# Patient Record
Sex: FEMALE | Race: WHITE | NOT HISPANIC OR LATINO
[De-identification: names, ages, dates, MRNs, and addresses within clinical notes are randomized per-mention and may not be internally consistent; named-entity substitution may affect disease eponyms.]

---

## 2017-01-20 ENCOUNTER — APPOINTMENT (OUTPATIENT)
Dept: CARDIOLOGY | Facility: CLINIC | Age: 82
End: 2017-01-20

## 2017-01-20 ENCOUNTER — NON-APPOINTMENT (OUTPATIENT)
Age: 82
End: 2017-01-20

## 2017-01-20 VITALS
WEIGHT: 216 LBS | HEART RATE: 78 BPM | OXYGEN SATURATION: 97 % | HEIGHT: 62 IN | BODY MASS INDEX: 39.75 KG/M2 | SYSTOLIC BLOOD PRESSURE: 160 MMHG | DIASTOLIC BLOOD PRESSURE: 86 MMHG

## 2017-01-23 ENCOUNTER — APPOINTMENT (OUTPATIENT)
Dept: SURGICAL ONCOLOGY | Facility: CLINIC | Age: 82
End: 2017-01-23

## 2017-01-24 ENCOUNTER — APPOINTMENT (OUTPATIENT)
Dept: CARDIOLOGY | Facility: CLINIC | Age: 82
End: 2017-01-24

## 2017-01-26 ENCOUNTER — MEDICATION RENEWAL (OUTPATIENT)
Age: 82
End: 2017-01-26

## 2017-01-27 ENCOUNTER — MEDICATION RENEWAL (OUTPATIENT)
Age: 82
End: 2017-01-27

## 2017-01-31 ENCOUNTER — OUTPATIENT (OUTPATIENT)
Dept: OUTPATIENT SERVICES | Facility: HOSPITAL | Age: 82
LOS: 1 days | End: 2017-01-31

## 2017-01-31 DIAGNOSIS — Z98.89 OTHER SPECIFIED POSTPROCEDURAL STATES: Chronic | ICD-10-CM

## 2017-01-31 DIAGNOSIS — Z90.710 ACQUIRED ABSENCE OF BOTH CERVIX AND UTERUS: Chronic | ICD-10-CM

## 2017-01-31 DIAGNOSIS — Z09 ENCOUNTER FOR FOLLOW-UP EXAMINATION AFTER COMPLETED TREATMENT FOR CONDITIONS OTHER THAN MALIGNANT NEOPLASM: Chronic | ICD-10-CM

## 2017-02-07 ENCOUNTER — APPOINTMENT (OUTPATIENT)
Dept: INTERNAL MEDICINE | Facility: CLINIC | Age: 82
End: 2017-02-07

## 2017-02-07 ENCOUNTER — LABORATORY RESULT (OUTPATIENT)
Age: 82
End: 2017-02-07

## 2017-02-07 VITALS
DIASTOLIC BLOOD PRESSURE: 79 MMHG | WEIGHT: 218 LBS | HEART RATE: 76 BPM | RESPIRATION RATE: 16 BRPM | SYSTOLIC BLOOD PRESSURE: 156 MMHG | BODY MASS INDEX: 39.87 KG/M2

## 2017-02-07 DIAGNOSIS — N30.00 ACUTE CYSTITIS W/OUT HEMATURIA: ICD-10-CM

## 2017-02-13 LAB
APPEARANCE: ABNORMAL
BILIRUBIN URINE: NEGATIVE
BLOOD URINE: NEGATIVE
COLOR: YELLOW
GLUCOSE QUALITATIVE U: NORMAL MG/DL
KETONES URINE: NEGATIVE
LEUKOCYTE ESTERASE URINE: NEGATIVE
NITRITE URINE: NEGATIVE
PH URINE: 7
PROTEIN URINE: NEGATIVE MG/DL
SPECIFIC GRAVITY URINE: 1.02
UROBILINOGEN URINE: 1 MG/DL

## 2017-02-21 ENCOUNTER — APPOINTMENT (OUTPATIENT)
Dept: INTERNAL MEDICINE | Facility: CLINIC | Age: 82
End: 2017-02-21

## 2017-02-21 VITALS
HEART RATE: 82 BPM | OXYGEN SATURATION: 94 % | RESPIRATION RATE: 14 BRPM | SYSTOLIC BLOOD PRESSURE: 112 MMHG | TEMPERATURE: 98.3 F | WEIGHT: 205 LBS | BODY MASS INDEX: 37.5 KG/M2 | DIASTOLIC BLOOD PRESSURE: 76 MMHG

## 2017-02-28 ENCOUNTER — INPATIENT (INPATIENT)
Facility: HOSPITAL | Age: 82
LOS: 2 days | Discharge: NURSING, REHAB OR LONG-TERM CARE | End: 2017-03-03
Payer: MEDICARE

## 2017-02-28 ENCOUNTER — OUTPATIENT (OUTPATIENT)
Dept: OUTPATIENT SERVICES | Facility: HOSPITAL | Age: 82
LOS: 1 days | End: 2017-02-28

## 2017-02-28 DIAGNOSIS — Z98.89 OTHER SPECIFIED POSTPROCEDURAL STATES: Chronic | ICD-10-CM

## 2017-02-28 DIAGNOSIS — Z90.710 ACQUIRED ABSENCE OF BOTH CERVIX AND UTERUS: Chronic | ICD-10-CM

## 2017-02-28 DIAGNOSIS — Z09 ENCOUNTER FOR FOLLOW-UP EXAMINATION AFTER COMPLETED TREATMENT FOR CONDITIONS OTHER THAN MALIGNANT NEOPLASM: Chronic | ICD-10-CM

## 2017-02-28 PROCEDURE — 73560 X-RAY EXAM OF KNEE 1 OR 2: CPT | Mod: 26,LT

## 2017-03-01 ENCOUNTER — OUTPATIENT (OUTPATIENT)
Dept: OUTPATIENT SERVICES | Facility: HOSPITAL | Age: 82
LOS: 1 days | End: 2017-03-01

## 2017-03-01 DIAGNOSIS — Z98.89 OTHER SPECIFIED POSTPROCEDURAL STATES: Chronic | ICD-10-CM

## 2017-03-01 DIAGNOSIS — Z90.710 ACQUIRED ABSENCE OF BOTH CERVIX AND UTERUS: Chronic | ICD-10-CM

## 2017-03-01 DIAGNOSIS — Z09 ENCOUNTER FOR FOLLOW-UP EXAMINATION AFTER COMPLETED TREATMENT FOR CONDITIONS OTHER THAN MALIGNANT NEOPLASM: Chronic | ICD-10-CM

## 2017-03-02 ENCOUNTER — OUTPATIENT (OUTPATIENT)
Dept: OUTPATIENT SERVICES | Facility: HOSPITAL | Age: 82
LOS: 1 days | End: 2017-03-02

## 2017-03-02 DIAGNOSIS — Z98.89 OTHER SPECIFIED POSTPROCEDURAL STATES: Chronic | ICD-10-CM

## 2017-03-02 DIAGNOSIS — Z09 ENCOUNTER FOR FOLLOW-UP EXAMINATION AFTER COMPLETED TREATMENT FOR CONDITIONS OTHER THAN MALIGNANT NEOPLASM: Chronic | ICD-10-CM

## 2017-03-02 DIAGNOSIS — Z90.710 ACQUIRED ABSENCE OF BOTH CERVIX AND UTERUS: Chronic | ICD-10-CM

## 2017-03-03 ENCOUNTER — OUTPATIENT (OUTPATIENT)
Dept: OUTPATIENT SERVICES | Facility: HOSPITAL | Age: 82
LOS: 1 days | End: 2017-03-03

## 2017-03-03 DIAGNOSIS — Z98.89 OTHER SPECIFIED POSTPROCEDURAL STATES: Chronic | ICD-10-CM

## 2017-03-03 DIAGNOSIS — Z09 ENCOUNTER FOR FOLLOW-UP EXAMINATION AFTER COMPLETED TREATMENT FOR CONDITIONS OTHER THAN MALIGNANT NEOPLASM: Chronic | ICD-10-CM

## 2017-03-03 DIAGNOSIS — Z90.710 ACQUIRED ABSENCE OF BOTH CERVIX AND UTERUS: Chronic | ICD-10-CM

## 2017-04-18 ENCOUNTER — RX RENEWAL (OUTPATIENT)
Age: 82
End: 2017-04-18

## 2017-04-26 ENCOUNTER — OTHER (OUTPATIENT)
Age: 82
End: 2017-04-26

## 2017-04-26 DIAGNOSIS — D64.9 ANEMIA, UNSPECIFIED: ICD-10-CM

## 2017-04-28 ENCOUNTER — RX RENEWAL (OUTPATIENT)
Age: 82
End: 2017-04-28

## 2017-05-02 ENCOUNTER — APPOINTMENT (OUTPATIENT)
Dept: INTERNAL MEDICINE | Facility: CLINIC | Age: 82
End: 2017-05-02

## 2017-05-03 LAB
25(OH)D3 SERPL-MCNC: 45.5 NG/ML
ALBUMIN SERPL ELPH-MCNC: 4.1 G/DL
ALP BLD-CCNC: 97 U/L
ALT SERPL-CCNC: 20 U/L
ANION GAP SERPL CALC-SCNC: 16 MMOL/L
APPEARANCE: CLEAR
AST SERPL-CCNC: 29 U/L
BASOPHILS # BLD AUTO: 0.03 K/UL
BASOPHILS NFR BLD AUTO: 0.5 %
BILIRUB SERPL-MCNC: 0.7 MG/DL
BILIRUBIN URINE: NEGATIVE
BLOOD URINE: NEGATIVE
BUN SERPL-MCNC: 23 MG/DL
CALCIUM SERPL-MCNC: 10 MG/DL
CHLORIDE SERPL-SCNC: 102 MMOL/L
CHOLEST SERPL-MCNC: 173 MG/DL
CHOLEST/HDLC SERPL: 2.4 RATIO
CO2 SERPL-SCNC: 22 MMOL/L
COLOR: YELLOW
CREAT SERPL-MCNC: 0.57 MG/DL
EOSINOPHIL # BLD AUTO: 0.29 K/UL
EOSINOPHIL NFR BLD AUTO: 5.1 %
GLUCOSE QUALITATIVE U: NORMAL MG/DL
GLUCOSE SERPL-MCNC: 127 MG/DL
HBA1C MFR BLD HPLC: 5.9 %
HCT VFR BLD CALC: 40.1 %
HDLC SERPL-MCNC: 73 MG/DL
HGB BLD-MCNC: 13 G/DL
IMM GRANULOCYTES NFR BLD AUTO: 0.4 %
KETONES URINE: NEGATIVE
LDLC SERPL CALC-MCNC: 79 MG/DL
LEUKOCYTE ESTERASE URINE: NEGATIVE
LYMPHOCYTES # BLD AUTO: 2.03 K/UL
LYMPHOCYTES NFR BLD AUTO: 35.7 %
MAN DIFF?: NORMAL
MCHC RBC-ENTMCNC: 30.1 PG
MCHC RBC-ENTMCNC: 32.4 GM/DL
MCV RBC AUTO: 92.8 FL
MONOCYTES # BLD AUTO: 0.74 K/UL
MONOCYTES NFR BLD AUTO: 13 %
NEUTROPHILS # BLD AUTO: 2.58 K/UL
NEUTROPHILS NFR BLD AUTO: 45.3 %
NITRITE URINE: NEGATIVE
PH URINE: 6.5
PLATELET # BLD AUTO: 228 K/UL
POTASSIUM SERPL-SCNC: 4.4 MMOL/L
PROT SERPL-MCNC: 7.5 G/DL
PROTEIN URINE: NEGATIVE MG/DL
RBC # BLD: 4.32 M/UL
RBC # FLD: 15.2 %
SODIUM SERPL-SCNC: 140 MMOL/L
SPECIFIC GRAVITY URINE: 1.01
TRIGL SERPL-MCNC: 103 MG/DL
UROBILINOGEN URINE: NORMAL MG/DL
WBC # FLD AUTO: 5.69 K/UL

## 2017-05-08 ENCOUNTER — RX RENEWAL (OUTPATIENT)
Age: 82
End: 2017-05-08

## 2017-06-13 ENCOUNTER — OTHER (OUTPATIENT)
Age: 82
End: 2017-06-13

## 2017-06-13 ENCOUNTER — NON-APPOINTMENT (OUTPATIENT)
Age: 82
End: 2017-06-13

## 2017-06-13 ENCOUNTER — APPOINTMENT (OUTPATIENT)
Dept: INTERNAL MEDICINE | Facility: CLINIC | Age: 82
End: 2017-06-13

## 2017-06-13 VITALS
OXYGEN SATURATION: 97 % | HEIGHT: 61 IN | DIASTOLIC BLOOD PRESSURE: 80 MMHG | BODY MASS INDEX: 37.57 KG/M2 | HEART RATE: 81 BPM | WEIGHT: 199 LBS | RESPIRATION RATE: 14 BRPM | SYSTOLIC BLOOD PRESSURE: 142 MMHG

## 2017-06-13 DIAGNOSIS — B37.9 CANDIDIASIS, UNSPECIFIED: ICD-10-CM

## 2017-06-13 RX ORDER — NYSTATIN AND TRIAMCINOLONE ACETONIDE 100000; 1 MG/G; MG/G
100000-0.1 CREAM TOPICAL TWICE DAILY
Qty: 1 | Refills: 5 | Status: ACTIVE | COMMUNITY
Start: 2017-06-13 | End: 1900-01-01

## 2017-06-20 ENCOUNTER — OTHER (OUTPATIENT)
Age: 82
End: 2017-06-20

## 2017-07-07 ENCOUNTER — MEDICATION RENEWAL (OUTPATIENT)
Age: 82
End: 2017-07-07

## 2017-07-10 LAB
ANION GAP SERPL CALC-SCNC: 14 MMOL/L
BACTERIA UR CULT: ABNORMAL
BASOPHILS # BLD AUTO: 0.01 K/UL
BASOPHILS NFR BLD AUTO: 0.2 %
BUN SERPL-MCNC: 27 MG/DL
CALCIUM SERPL-MCNC: 10.3 MG/DL
CHLORIDE SERPL-SCNC: 100 MMOL/L
CO2 SERPL-SCNC: 25 MMOL/L
CREAT SERPL-MCNC: 0.72 MG/DL
EOSINOPHIL # BLD AUTO: 0.13 K/UL
EOSINOPHIL NFR BLD AUTO: 2.5 %
GLUCOSE SERPL-MCNC: 104 MG/DL
HCT VFR BLD CALC: 40.8 %
HGB BLD-MCNC: 13.3 G/DL
IMM GRANULOCYTES NFR BLD AUTO: 0.4 %
LYMPHOCYTES # BLD AUTO: 1.36 K/UL
LYMPHOCYTES NFR BLD AUTO: 25.7 %
MAN DIFF?: NORMAL
MCHC RBC-ENTMCNC: 29.8 PG
MCHC RBC-ENTMCNC: 32.6 GM/DL
MCV RBC AUTO: 91.3 FL
MONOCYTES # BLD AUTO: 0.75 K/UL
MONOCYTES NFR BLD AUTO: 14.2 %
NEUTROPHILS # BLD AUTO: 3.03 K/UL
NEUTROPHILS NFR BLD AUTO: 57 %
PLATELET # BLD AUTO: 209 K/UL
POTASSIUM SERPL-SCNC: 4.2 MMOL/L
RBC # BLD: 4.47 M/UL
RBC # FLD: 14.1 %
SODIUM SERPL-SCNC: 139 MMOL/L
WBC # FLD AUTO: 5.3 K/UL

## 2017-09-26 ENCOUNTER — APPOINTMENT (OUTPATIENT)
Dept: INTERNAL MEDICINE | Facility: CLINIC | Age: 82
End: 2017-09-26
Payer: COMMERCIAL

## 2017-09-26 VITALS
BODY MASS INDEX: 37.41 KG/M2 | SYSTOLIC BLOOD PRESSURE: 126 MMHG | WEIGHT: 198 LBS | OXYGEN SATURATION: 97 % | DIASTOLIC BLOOD PRESSURE: 82 MMHG | HEART RATE: 80 BPM | RESPIRATION RATE: 14 BRPM

## 2017-09-26 DIAGNOSIS — R07.9 CHEST PAIN, UNSPECIFIED: ICD-10-CM

## 2017-09-26 PROCEDURE — 99214 OFFICE O/P EST MOD 30 MIN: CPT

## 2017-10-10 ENCOUNTER — RX RENEWAL (OUTPATIENT)
Age: 82
End: 2017-10-10

## 2017-10-24 ENCOUNTER — CLINICAL ADVICE (OUTPATIENT)
Age: 82
End: 2017-10-24

## 2017-10-30 ENCOUNTER — OTHER (OUTPATIENT)
Age: 82
End: 2017-10-30

## 2017-11-03 ENCOUNTER — RX RENEWAL (OUTPATIENT)
Age: 82
End: 2017-11-03

## 2017-11-10 ENCOUNTER — OTHER (OUTPATIENT)
Age: 82
End: 2017-11-10

## 2017-11-13 LAB
ALBUMIN SERPL ELPH-MCNC: 3.7 G/DL
ALP BLD-CCNC: 115 U/L
ALT SERPL-CCNC: 22 U/L
ANION GAP SERPL CALC-SCNC: 14 MMOL/L
APPEARANCE: CLEAR
AST SERPL-CCNC: 21 U/L
BASOPHILS # BLD AUTO: 0.02 K/UL
BASOPHILS NFR BLD AUTO: 0.4 %
BILIRUB SERPL-MCNC: 0.7 MG/DL
BILIRUBIN URINE: NEGATIVE
BLOOD URINE: NEGATIVE
BUN SERPL-MCNC: 19 MG/DL
CALCIUM SERPL-MCNC: 9.5 MG/DL
CHLORIDE SERPL-SCNC: 100 MMOL/L
CHOLEST SERPL-MCNC: 158 MG/DL
CHOLEST/HDLC SERPL: 2.3 RATIO
CO2 SERPL-SCNC: 26 MMOL/L
COLOR: YELLOW
CREAT SERPL-MCNC: 0.69 MG/DL
EOSINOPHIL # BLD AUTO: 0.14 K/UL
EOSINOPHIL NFR BLD AUTO: 2.8 %
GLUCOSE QUALITATIVE U: NEGATIVE MG/DL
GLUCOSE SERPL-MCNC: 141 MG/DL
HBA1C MFR BLD HPLC: 5.5 %
HCT VFR BLD CALC: 40.4 %
HDLC SERPL-MCNC: 70 MG/DL
HGB BLD-MCNC: 13.4 G/DL
IMM GRANULOCYTES NFR BLD AUTO: 0.2 %
KETONES URINE: NEGATIVE
LDLC SERPL CALC-MCNC: 69 MG/DL
LEUKOCYTE ESTERASE URINE: ABNORMAL
LYMPHOCYTES # BLD AUTO: 1.64 K/UL
LYMPHOCYTES NFR BLD AUTO: 33.3 %
MAN DIFF?: NORMAL
MCHC RBC-ENTMCNC: 29.8 PG
MCHC RBC-ENTMCNC: 33.2 GM/DL
MCV RBC AUTO: 90 FL
MONOCYTES # BLD AUTO: 0.63 K/UL
MONOCYTES NFR BLD AUTO: 12.8 %
NEUTROPHILS # BLD AUTO: 2.49 K/UL
NEUTROPHILS NFR BLD AUTO: 50.5 %
NITRITE URINE: POSITIVE
PH URINE: 6
PLATELET # BLD AUTO: 192 K/UL
POTASSIUM SERPL-SCNC: 3.9 MMOL/L
PROT SERPL-MCNC: 7.5 G/DL
PROTEIN URINE: NEGATIVE MG/DL
RBC # BLD: 4.49 M/UL
RBC # FLD: 13.4 %
SODIUM SERPL-SCNC: 140 MMOL/L
SPECIFIC GRAVITY URINE: 1.01
TRIGL SERPL-MCNC: 95 MG/DL
UROBILINOGEN URINE: NEGATIVE MG/DL
WBC # FLD AUTO: 4.93 K/UL

## 2017-11-14 ENCOUNTER — APPOINTMENT (OUTPATIENT)
Dept: INTERNAL MEDICINE | Facility: CLINIC | Age: 82
End: 2017-11-14
Payer: COMMERCIAL

## 2017-11-14 ENCOUNTER — NON-APPOINTMENT (OUTPATIENT)
Age: 82
End: 2017-11-14

## 2017-11-14 VITALS
SYSTOLIC BLOOD PRESSURE: 132 MMHG | HEIGHT: 61 IN | OXYGEN SATURATION: 98 % | HEART RATE: 82 BPM | BODY MASS INDEX: 37.76 KG/M2 | DIASTOLIC BLOOD PRESSURE: 80 MMHG | WEIGHT: 200 LBS

## 2017-11-14 PROCEDURE — 90686 IIV4 VACC NO PRSV 0.5 ML IM: CPT

## 2017-11-14 PROCEDURE — G0008: CPT

## 2017-11-14 PROCEDURE — 99214 OFFICE O/P EST MOD 30 MIN: CPT | Mod: 25

## 2017-11-14 PROCEDURE — 93000 ELECTROCARDIOGRAM COMPLETE: CPT

## 2017-11-28 ENCOUNTER — APPOINTMENT (OUTPATIENT)
Dept: CARDIOLOGY | Facility: CLINIC | Age: 82
End: 2017-11-28
Payer: COMMERCIAL

## 2017-11-28 PROCEDURE — 93880 EXTRACRANIAL BILAT STUDY: CPT

## 2017-11-30 ENCOUNTER — RX RENEWAL (OUTPATIENT)
Age: 82
End: 2017-11-30

## 2018-02-27 ENCOUNTER — APPOINTMENT (OUTPATIENT)
Dept: CARDIOLOGY | Facility: CLINIC | Age: 83
End: 2018-02-27
Payer: COMMERCIAL

## 2018-02-27 VITALS
WEIGHT: 201 LBS | OXYGEN SATURATION: 98 % | BODY MASS INDEX: 37.95 KG/M2 | DIASTOLIC BLOOD PRESSURE: 93 MMHG | SYSTOLIC BLOOD PRESSURE: 150 MMHG | HEIGHT: 61 IN | HEART RATE: 79 BPM

## 2018-02-27 PROCEDURE — 99214 OFFICE O/P EST MOD 30 MIN: CPT

## 2018-05-21 ENCOUNTER — APPOINTMENT (OUTPATIENT)
Dept: CARDIOLOGY | Facility: CLINIC | Age: 83
End: 2018-05-21
Payer: COMMERCIAL

## 2018-05-21 PROCEDURE — 93306 TTE W/DOPPLER COMPLETE: CPT

## 2018-05-24 ENCOUNTER — APPOINTMENT (OUTPATIENT)
Dept: CARDIOLOGY | Facility: CLINIC | Age: 83
End: 2018-05-24

## 2018-06-04 ENCOUNTER — TRANSCRIPTION ENCOUNTER (OUTPATIENT)
Age: 83
End: 2018-06-04

## 2018-06-05 ENCOUNTER — RESULT REVIEW (OUTPATIENT)
Age: 83
End: 2018-06-05

## 2018-06-05 ENCOUNTER — OUTPATIENT (OUTPATIENT)
Dept: OUTPATIENT SERVICES | Facility: HOSPITAL | Age: 83
LOS: 1 days | End: 2018-06-05
Payer: COMMERCIAL

## 2018-06-05 DIAGNOSIS — Z98.89 OTHER SPECIFIED POSTPROCEDURAL STATES: Chronic | ICD-10-CM

## 2018-06-05 DIAGNOSIS — Z90.710 ACQUIRED ABSENCE OF BOTH CERVIX AND UTERUS: Chronic | ICD-10-CM

## 2018-06-05 DIAGNOSIS — K21.0 GASTRO-ESOPHAGEAL REFLUX DISEASE WITH ESOPHAGITIS: ICD-10-CM

## 2018-06-05 DIAGNOSIS — Z09 ENCOUNTER FOR FOLLOW-UP EXAMINATION AFTER COMPLETED TREATMENT FOR CONDITIONS OTHER THAN MALIGNANT NEOPLASM: Chronic | ICD-10-CM

## 2018-06-05 PROCEDURE — 88108 CYTOPATH CONCENTRATE TECH: CPT

## 2018-06-05 PROCEDURE — 43239 EGD BIOPSY SINGLE/MULTIPLE: CPT

## 2018-06-05 PROCEDURE — 88305 TISSUE EXAM BY PATHOLOGIST: CPT

## 2018-06-05 PROCEDURE — 88313 SPECIAL STAINS GROUP 2: CPT | Mod: 26

## 2018-06-05 PROCEDURE — 88312 SPECIAL STAINS GROUP 1: CPT | Mod: 26

## 2018-06-05 PROCEDURE — 88305 TISSUE EXAM BY PATHOLOGIST: CPT | Mod: 26

## 2018-06-05 PROCEDURE — 88108 CYTOPATH CONCENTRATE TECH: CPT | Mod: 26

## 2018-06-05 PROCEDURE — 88313 SPECIAL STAINS GROUP 2: CPT

## 2018-06-05 PROCEDURE — 88312 SPECIAL STAINS GROUP 1: CPT

## 2018-06-06 LAB
NON-GYN CYTOLOGY SPEC: SIGNIFICANT CHANGE UP
SURGICAL PATHOLOGY FINAL REPORT - CH: SIGNIFICANT CHANGE UP

## 2018-07-05 ENCOUNTER — EMERGENCY (EMERGENCY)
Facility: HOSPITAL | Age: 83
LOS: 1 days | Discharge: ANOTHER HOSPITAL | End: 2018-07-05
Attending: EMERGENCY MEDICINE
Payer: COMMERCIAL

## 2018-07-05 ENCOUNTER — INPATIENT (INPATIENT)
Facility: HOSPITAL | Age: 83
LOS: 4 days | Discharge: HOME HEALTH | DRG: 640 | End: 2018-07-10
Attending: HOSPITALIST | Admitting: HOSPITALIST
Payer: MEDICARE

## 2018-07-05 VITALS
DIASTOLIC BLOOD PRESSURE: 59 MMHG | TEMPERATURE: 98 F | RESPIRATION RATE: 14 BRPM | WEIGHT: 190.7 LBS | HEIGHT: 62 IN | HEART RATE: 96 BPM | SYSTOLIC BLOOD PRESSURE: 125 MMHG | OXYGEN SATURATION: 97 %

## 2018-07-05 VITALS — RESPIRATION RATE: 26 BRPM | HEIGHT: 62 IN | WEIGHT: 205.03 LBS | HEART RATE: 30 BPM

## 2018-07-05 VITALS
DIASTOLIC BLOOD PRESSURE: 45 MMHG | HEART RATE: 100 BPM | RESPIRATION RATE: 15 BRPM | OXYGEN SATURATION: 98 % | SYSTOLIC BLOOD PRESSURE: 105 MMHG | TEMPERATURE: 96 F

## 2018-07-05 DIAGNOSIS — I49.8 OTHER SPECIFIED CARDIAC ARRHYTHMIAS: ICD-10-CM

## 2018-07-05 DIAGNOSIS — Z98.89 OTHER SPECIFIED POSTPROCEDURAL STATES: Chronic | ICD-10-CM

## 2018-07-05 DIAGNOSIS — I45.9 CONDUCTION DISORDER, UNSPECIFIED: ICD-10-CM

## 2018-07-05 DIAGNOSIS — E87.5 HYPERKALEMIA: ICD-10-CM

## 2018-07-05 DIAGNOSIS — Z09 ENCOUNTER FOR FOLLOW-UP EXAMINATION AFTER COMPLETED TREATMENT FOR CONDITIONS OTHER THAN MALIGNANT NEOPLASM: Chronic | ICD-10-CM

## 2018-07-05 DIAGNOSIS — E87.2 ACIDOSIS: ICD-10-CM

## 2018-07-05 DIAGNOSIS — Z90.710 ACQUIRED ABSENCE OF BOTH CERVIX AND UTERUS: Chronic | ICD-10-CM

## 2018-07-05 LAB
ALBUMIN SERPL ELPH-MCNC: 3.5 G/DL — SIGNIFICANT CHANGE UP (ref 3.3–5)
ALBUMIN SERPL ELPH-MCNC: 3.7 G/DL — SIGNIFICANT CHANGE UP (ref 3.3–5)
ALP SERPL-CCNC: 153 U/L — HIGH (ref 40–120)
ALP SERPL-CCNC: 176 U/L — HIGH (ref 40–120)
ALT FLD-CCNC: 43 U/L — SIGNIFICANT CHANGE UP (ref 10–45)
ALT FLD-CCNC: 53 U/L — SIGNIFICANT CHANGE UP (ref 12–78)
ANION GAP SERPL CALC-SCNC: 10 MMOL/L — SIGNIFICANT CHANGE UP (ref 5–17)
ANION GAP SERPL CALC-SCNC: 16 MMOL/L — SIGNIFICANT CHANGE UP (ref 5–17)
APTT BLD: 31.6 SEC — SIGNIFICANT CHANGE UP (ref 27.5–37.4)
AST SERPL-CCNC: 34 U/L — SIGNIFICANT CHANGE UP (ref 10–40)
AST SERPL-CCNC: 40 U/L — HIGH (ref 15–37)
BASOPHILS # BLD AUTO: 0.04 K/UL — SIGNIFICANT CHANGE UP (ref 0–0.2)
BASOPHILS NFR BLD AUTO: 0.5 % — SIGNIFICANT CHANGE UP (ref 0–2)
BILIRUB SERPL-MCNC: 0.4 MG/DL — SIGNIFICANT CHANGE UP (ref 0.2–1.2)
BILIRUB SERPL-MCNC: 0.4 MG/DL — SIGNIFICANT CHANGE UP (ref 0.2–1.2)
BUN SERPL-MCNC: 52 MG/DL — HIGH (ref 7–23)
BUN SERPL-MCNC: 54 MG/DL — HIGH (ref 7–23)
CALCIUM SERPL-MCNC: 10.7 MG/DL — HIGH (ref 8.4–10.5)
CALCIUM SERPL-MCNC: 9.7 MG/DL — SIGNIFICANT CHANGE UP (ref 8.5–10.1)
CHLORIDE SERPL-SCNC: 104 MMOL/L — SIGNIFICANT CHANGE UP (ref 96–108)
CHLORIDE SERPL-SCNC: 107 MMOL/L — SIGNIFICANT CHANGE UP (ref 96–108)
CK MB BLD-MCNC: 7.6 % — HIGH (ref 0–3.5)
CK MB CFR SERPL CALC: 3.2 NG/ML — SIGNIFICANT CHANGE UP (ref 0–3.6)
CK MB CFR SERPL CALC: 4.3 NG/ML — HIGH (ref 0–3.8)
CK SERPL-CCNC: 34 U/L — SIGNIFICANT CHANGE UP (ref 25–170)
CK SERPL-CCNC: 42 U/L — SIGNIFICANT CHANGE UP (ref 26–192)
CO2 SERPL-SCNC: 16 MMOL/L — LOW (ref 22–31)
CO2 SERPL-SCNC: 17 MMOL/L — LOW (ref 22–31)
CREAT SERPL-MCNC: 1.32 MG/DL — HIGH (ref 0.5–1.3)
CREAT SERPL-MCNC: 1.5 MG/DL — HIGH (ref 0.5–1.3)
EOSINOPHIL # BLD AUTO: 0.17 K/UL — SIGNIFICANT CHANGE UP (ref 0–0.5)
EOSINOPHIL NFR BLD AUTO: 2.1 % — SIGNIFICANT CHANGE UP (ref 0–6)
GLUCOSE SERPL-MCNC: 185 MG/DL — HIGH (ref 70–99)
GLUCOSE SERPL-MCNC: 61 MG/DL — LOW (ref 70–99)
HCT VFR BLD CALC: 36.4 % — SIGNIFICANT CHANGE UP (ref 34.5–45)
HCT VFR BLD CALC: 36.9 % — SIGNIFICANT CHANGE UP (ref 34.5–45)
HGB BLD-MCNC: 12.6 G/DL — SIGNIFICANT CHANGE UP (ref 11.5–15.5)
HGB BLD-MCNC: 12.8 G/DL — SIGNIFICANT CHANGE UP (ref 11.5–15.5)
IMM GRANULOCYTES NFR BLD AUTO: 0.5 % — SIGNIFICANT CHANGE UP (ref 0–1.5)
INR BLD: 1.03 RATIO — SIGNIFICANT CHANGE UP (ref 0.88–1.16)
LYMPHOCYTES # BLD AUTO: 1.77 K/UL — SIGNIFICANT CHANGE UP (ref 1–3.3)
LYMPHOCYTES # BLD AUTO: 21.4 % — SIGNIFICANT CHANGE UP (ref 13–44)
MAGNESIUM SERPL-MCNC: 1.9 MG/DL — SIGNIFICANT CHANGE UP (ref 1.6–2.6)
MCHC RBC-ENTMCNC: 30.4 PG — SIGNIFICANT CHANGE UP (ref 27–34)
MCHC RBC-ENTMCNC: 32.2 PG — SIGNIFICANT CHANGE UP (ref 27–34)
MCHC RBC-ENTMCNC: 34.1 GM/DL — SIGNIFICANT CHANGE UP (ref 32–36)
MCHC RBC-ENTMCNC: 35.1 GM/DL — SIGNIFICANT CHANGE UP (ref 32–36)
MCV RBC AUTO: 89.1 FL — SIGNIFICANT CHANGE UP (ref 80–100)
MCV RBC AUTO: 91.9 FL — SIGNIFICANT CHANGE UP (ref 80–100)
MONOCYTES # BLD AUTO: 0.79 K/UL — SIGNIFICANT CHANGE UP (ref 0–0.9)
MONOCYTES NFR BLD AUTO: 9.6 % — SIGNIFICANT CHANGE UP (ref 2–14)
NEUTROPHILS # BLD AUTO: 5.46 K/UL — SIGNIFICANT CHANGE UP (ref 1.8–7.4)
NEUTROPHILS NFR BLD AUTO: 65.9 % — SIGNIFICANT CHANGE UP (ref 43–77)
PHOSPHATE SERPL-MCNC: 3.6 MG/DL — SIGNIFICANT CHANGE UP (ref 2.5–4.5)
PLATELET # BLD AUTO: 205 K/UL — SIGNIFICANT CHANGE UP (ref 150–400)
PLATELET # BLD AUTO: 283 K/UL — SIGNIFICANT CHANGE UP (ref 150–400)
POTASSIUM SERPL-MCNC: 6.6 MMOL/L — CRITICAL HIGH (ref 3.5–5.3)
POTASSIUM SERPL-MCNC: 7 MMOL/L — CRITICAL HIGH (ref 3.5–5.3)
POTASSIUM SERPL-SCNC: 6.6 MMOL/L — CRITICAL HIGH (ref 3.5–5.3)
POTASSIUM SERPL-SCNC: 7 MMOL/L — CRITICAL HIGH (ref 3.5–5.3)
PROT SERPL-MCNC: 6.9 G/DL — SIGNIFICANT CHANGE UP (ref 6–8.3)
PROT SERPL-MCNC: 7.6 G/DL — SIGNIFICANT CHANGE UP (ref 6–8.3)
PROTHROM AB SERPL-ACNC: 11.2 SEC — SIGNIFICANT CHANGE UP (ref 9.8–12.7)
RBC # BLD: 3.97 M/UL — SIGNIFICANT CHANGE UP (ref 3.8–5.2)
RBC # BLD: 4.14 M/UL — SIGNIFICANT CHANGE UP (ref 3.8–5.2)
RBC # FLD: 13.8 % — SIGNIFICANT CHANGE UP (ref 10.3–14.5)
RBC # FLD: 15.4 % — HIGH (ref 10.3–14.5)
SODIUM SERPL-SCNC: 134 MMOL/L — LOW (ref 135–145)
SODIUM SERPL-SCNC: 136 MMOL/L — SIGNIFICANT CHANGE UP (ref 135–145)
TROPONIN I SERPL-MCNC: <.015 NG/ML — SIGNIFICANT CHANGE UP (ref 0.01–0.04)
TROPONIN T, HIGH SENSITIVITY RESULT: 43 NG/L — SIGNIFICANT CHANGE UP (ref 0–51)
WBC # BLD: 6.1 K/UL — SIGNIFICANT CHANGE UP (ref 3.8–10.5)
WBC # BLD: 8.27 K/UL — SIGNIFICANT CHANGE UP (ref 3.8–10.5)
WBC # FLD AUTO: 6.1 K/UL — SIGNIFICANT CHANGE UP (ref 3.8–10.5)
WBC # FLD AUTO: 8.27 K/UL — SIGNIFICANT CHANGE UP (ref 3.8–10.5)

## 2018-07-05 PROCEDURE — 93619 COMPREHENSIVE EP EVALUATION: CPT | Mod: 26

## 2018-07-05 PROCEDURE — 96374 THER/PROPH/DIAG INJ IV PUSH: CPT

## 2018-07-05 PROCEDURE — 84484 ASSAY OF TROPONIN QUANT: CPT

## 2018-07-05 PROCEDURE — 96375 TX/PRO/DX INJ NEW DRUG ADDON: CPT

## 2018-07-05 PROCEDURE — 93005 ELECTROCARDIOGRAM TRACING: CPT

## 2018-07-05 PROCEDURE — 85610 PROTHROMBIN TIME: CPT

## 2018-07-05 PROCEDURE — 92953 TEMPORARY EXTERNAL PACING: CPT | Mod: 59

## 2018-07-05 PROCEDURE — 82550 ASSAY OF CK (CPK): CPT

## 2018-07-05 PROCEDURE — 82962 GLUCOSE BLOOD TEST: CPT

## 2018-07-05 PROCEDURE — 83880 ASSAY OF NATRIURETIC PEPTIDE: CPT

## 2018-07-05 PROCEDURE — 92953 TEMPORARY EXTERNAL PACING: CPT

## 2018-07-05 PROCEDURE — 85730 THROMBOPLASTIN TIME PARTIAL: CPT

## 2018-07-05 PROCEDURE — 93010 ELECTROCARDIOGRAM REPORT: CPT

## 2018-07-05 PROCEDURE — 82553 CREATINE MB FRACTION: CPT

## 2018-07-05 PROCEDURE — 85027 COMPLETE CBC AUTOMATED: CPT

## 2018-07-05 PROCEDURE — 99291 CRITICAL CARE FIRST HOUR: CPT | Mod: 25

## 2018-07-05 PROCEDURE — 80053 COMPREHEN METABOLIC PANEL: CPT

## 2018-07-05 RX ORDER — INSULIN HUMAN 100 [IU]/ML
10 INJECTION, SOLUTION SUBCUTANEOUS ONCE
Qty: 0 | Refills: 0 | Status: COMPLETED | OUTPATIENT
Start: 2018-07-05 | End: 2018-07-05

## 2018-07-05 RX ORDER — MORPHINE SULFATE 50 MG/1
4 CAPSULE, EXTENDED RELEASE ORAL ONCE
Qty: 0 | Refills: 0 | Status: COMPLETED | OUTPATIENT
Start: 2018-07-05 | End: 2018-07-05

## 2018-07-05 RX ORDER — SODIUM POLYSTYRENE SULFONATE 4.1 MEQ/G
30 POWDER, FOR SUSPENSION ORAL ONCE
Qty: 0 | Refills: 0 | Status: COMPLETED | OUTPATIENT
Start: 2018-07-05 | End: 2018-07-05

## 2018-07-05 RX ORDER — CALCIUM GLUCONATE 100 MG/ML
2 VIAL (ML) INTRAVENOUS ONCE
Qty: 0 | Refills: 0 | Status: COMPLETED | OUTPATIENT
Start: 2018-07-05 | End: 2018-07-05

## 2018-07-05 RX ORDER — LIDOCAINE 4 G/100G
1 CREAM TOPICAL DAILY
Qty: 0 | Refills: 0 | Status: DISCONTINUED | OUTPATIENT
Start: 2018-07-05 | End: 2018-07-10

## 2018-07-05 RX ORDER — SODIUM BICARBONATE 1 MEQ/ML
50 SYRINGE (ML) INTRAVENOUS ONCE
Qty: 0 | Refills: 0 | Status: COMPLETED | OUTPATIENT
Start: 2018-07-05 | End: 2018-07-05

## 2018-07-05 RX ORDER — CHLORHEXIDINE GLUCONATE 213 G/1000ML
1 SOLUTION TOPICAL ONCE
Qty: 0 | Refills: 0 | Status: DISCONTINUED | OUTPATIENT
Start: 2018-07-06 | End: 2018-07-06

## 2018-07-05 RX ORDER — DEXTROSE 50 % IN WATER 50 %
50 SYRINGE (ML) INTRAVENOUS ONCE
Qty: 0 | Refills: 0 | Status: COMPLETED | OUTPATIENT
Start: 2018-07-05 | End: 2018-07-05

## 2018-07-05 RX ORDER — SODIUM CHLORIDE 9 MG/ML
3 INJECTION INTRAMUSCULAR; INTRAVENOUS; SUBCUTANEOUS ONCE
Qty: 0 | Refills: 0 | Status: COMPLETED | OUTPATIENT
Start: 2018-07-05 | End: 2018-07-05

## 2018-07-05 RX ORDER — VANCOMYCIN HCL 1 G
1000 VIAL (EA) INTRAVENOUS ONCE
Qty: 0 | Refills: 0 | Status: DISCONTINUED | OUTPATIENT
Start: 2018-07-05 | End: 2018-07-06

## 2018-07-05 RX ORDER — SODIUM CHLORIDE 9 MG/ML
1000 INJECTION INTRAMUSCULAR; INTRAVENOUS; SUBCUTANEOUS ONCE
Qty: 0 | Refills: 0 | Status: COMPLETED | OUTPATIENT
Start: 2018-07-05 | End: 2018-07-05

## 2018-07-05 RX ADMIN — SODIUM POLYSTYRENE SULFONATE 30 GRAM(S): 4.1 POWDER, FOR SUSPENSION ORAL at 23:42

## 2018-07-05 RX ADMIN — Medication 50 MILLILITER(S): at 20:00

## 2018-07-05 RX ADMIN — Medication 200 GRAM(S): at 20:06

## 2018-07-05 RX ADMIN — LIDOCAINE 1 PATCH: 4 CREAM TOPICAL at 22:45

## 2018-07-05 RX ADMIN — Medication 50 MILLIEQUIVALENT(S): at 19:56

## 2018-07-05 RX ADMIN — SODIUM CHLORIDE 3 MILLILITER(S): 9 INJECTION INTRAMUSCULAR; INTRAVENOUS; SUBCUTANEOUS at 19:36

## 2018-07-05 RX ADMIN — SODIUM CHLORIDE 1000 MILLILITER(S): 9 INJECTION INTRAMUSCULAR; INTRAVENOUS; SUBCUTANEOUS at 20:36

## 2018-07-05 RX ADMIN — INSULIN HUMAN 10 UNIT(S): 100 INJECTION, SOLUTION SUBCUTANEOUS at 19:56

## 2018-07-05 RX ADMIN — Medication 50 MILLIEQUIVALENT(S): at 23:42

## 2018-07-05 RX ADMIN — SODIUM CHLORIDE 1000 MILLILITER(S): 9 INJECTION INTRAMUSCULAR; INTRAVENOUS; SUBCUTANEOUS at 19:30

## 2018-07-05 NOTE — ED PROCEDURE NOTE - CPROC ED TRANSCU PACE DETAIL1
Pacemaker pads applied to right anterior and left posterior chest. Demand pacing was initiated as documented above.

## 2018-07-05 NOTE — H&P ADULT - ASSESSMENT
82 yo female pmhx HTN, COPD (emphysematous type), LBBB, IBS, and gout p/w n/v, and encephalopathy in the setting of hyperkalemia (K of 7) and CHB which resolved with transcutaneous pacing and insulin/dextrose, now in NSR and overall clinical improvement    # Neuro - no hx of CVA, AOx4  - no active issues    # Cardiac    1) CHB - resolved after transcutaneous pacing and insulin/dextrose for hyperkalemia, more likely 2/2 electrolyte abnormality   - monitor BMP BID; K > 4; Mg > 2  - monitor on telemetry  - f/u EP recs; unlikely to need TVP or PPM now that CHB has resolved but will place TVP if pt returns to CHB     2) LBBB - chronic, stable  - monitor on telemetry  - K > 4, Mg > 2    # Pulmonary    1) COPD  - c/w O2 NC  - c/w duonebs PRN  - monitor O2 sats; goal sat 88-92 to maintain respiratory drive    GI - hx of IBS w/ diarrhea and constipation, currently not having issues  - DASH-TLC diet  - will monitor BMs now that pt has received kayexalate and has a hx of IBS w/ diarrhea; will monitor BMP for K    Renal/Electrolytes/Metabolic - no hx of renal disease    1) Hyperkalemia - K now 6.6 from 7 s/p insulin and dextrose; Kayexalate and 1 amp of bicarb given  - trend BMP Q8 for Cr and electrolytes  - K > 4, Mg > 2    2) LUCIUS - Cr initially at 1.5, now at 1.32 likely pre-renal azotemia in the setting of poor PO intake and insensible losses c/b CHB and bradycardia  - trend BMP Q8 for Cr and electrolytes  - encourage PO intake  - renally dose all meds, avoid nephrotoxins, strict I&O, daily weights, avoid RCA    # Endocrine - no hx of DM or thyroid disease  - f/u A1c, TSH, and lipid profile    # Heme - no hx of hematologic disease, not anemic/thrombocytopenic  - trend CBC  - maintain active T&S; Hgb > 8     # Infectious Disease - not currently infected  - no active issues    # Ethics  - full code 82 yo female pmhx HTN, COPD (emphysematous type), LBBB, IBS, and gout p/w n/v, and encephalopathy in the setting of hyperkalemia (K of 7) and CHB which resolved with transcutaneous pacing and insulin/dextrose, now in NSR and overall clinical improvement    # Neuro - no hx of CVA, AOx4  - no active issues    # Cardiac    1) CHB - resolved after transcutaneous pacing and insulin/dextrose/kayexalate x2/duoneb for hyperkalemia, more likely 2/2 electrolyte abnormality   - monitor BMP BID; K > 4; Mg > 2  - monitor on telemetry  - f/u EP recs; unlikely to need TVP or PPM now that CHB has resolved but will place TVP if pt returns to CHB   - treat Hyperkalemia  - check TTE  - pacing pads in place   - NPO for possible PPM - will establish necessity in the AM    2) LBBB - chronic, stable  - monitor on telemetry  - K > 4, Mg > 2    # Pulmonary    1) COPD  - c/w O2 NC  - c/w duonebs PRN  - monitor O2 sats; goal sat 88-92 to maintain respiratory drive    GI - hx of IBS w/ diarrhea and constipation, currently not having issues  - DASH-TLC diet  - will monitor BMs now that pt has received kayexalate and has a hx of IBS w/ diarrhea; will monitor BMP for K    Renal/Electrolytes/Metabolic - no hx of renal disease    1) Hyperkalemia - K now 6.6 from 7 s/p insulin and dextrose; Kayexalate and 1 amp of bicarb given  - trend BMP Q8 for Cr and electrolytes  - K > 4, Mg > 2    2) LUCIUS - Cr initially at 1.5, now at 1.32 likely pre-renal azotemia in the setting of poor PO intake and insensible losses c/b CHB and bradycardia  - trend BMP Q8 for Cr and electrolytes  - encourage PO intake  - renally dose all meds, avoid nephrotoxins, strict I&O, daily weights, avoid RCA    # Endocrine - no hx of DM or thyroid disease  - f/u A1c, TSH, and lipid profile    # Heme - no hx of hematologic disease, not anemic/thrombocytopenic  - trend CBC  - maintain active T&S; Hgb > 8     # Infectious Disease - not currently infected  - no active issues    # Ethics  - full code

## 2018-07-05 NOTE — ED PROVIDER NOTE - CARE PLAN
Principal Discharge DX:	Heart block  Assessment and plan of treatment:	transfer  Secondary Diagnosis:	Hyperkalemia

## 2018-07-05 NOTE — H&P ADULT - HISTORY OF PRESENT ILLNESS
82 yo female pmhx HTN, COPD, LBB, Gout, Liver disease biba from home with complaints of SOB, nausea, and sudden onset of feeling ill and seeing yellow spots.  Upon arrival to ED, patient found to be lethargic, bradycardic to 30s, pacing pads placed on patient's chest and was being paced externally, after which pt had high 90s to low 100s, was awake, alert and oriented and "feeling much better".  EKG shows left bundle branch block and wide QRS complexes over the anterior/septal leads.  Labs significant for potassium of 7, serum CO2 of 17, BUN 54, Cr 1.5.  As per patient she takes hydrochlorothiazide and potassium supplementation at home.  At this time patient endorses feeling better.  Patient denies chest pain, palpitations, cough, hemoptysis, dizziness, change in vision, numbness/tingling, nausea, vomiting, diarrhea, constipation, fever, chills, recent illness or sick contacts.      Pt received insulin and dextrose for hyperkalemia; repeat not done at OSH. 82 yo female pmhx HTN, COPD, LBB, Gout, Liver disease biba from home with complaints of SOB, nausea, and sudden onset of feeling ill and seeing yellow spots.  Upon arrival to ED, patient found to be lethargic, bradycardic to 30s, pacing pads placed on patient's chest and was being paced externally, after which pt had high 90s to low 100s, was awake, alert and oriented and "feeling much better".  EKG shows left bundle branch block and wide QRS complexes over the anterior/septal leads.  Labs significant for potassium of 7, serum CO2 of 17, BUN 54, Cr 1.5.  As per patient she takes hydrochlorothiazide and potassium supplementation at home.  At this time patient endorses feeling better.  Patient denies chest pain, palpitations, cough, hemoptysis, dizziness, change in vision, numbness/tingling, nausea, vomiting, diarrhea, constipation, fever, chills, recent illness or sick contacts.      Pt received insulin and dextrose for hyperkalemia; repeat not done at OSH.      Pt transferred to [facility] for possible TVP and PPM; pt remains in NSR

## 2018-07-05 NOTE — ED ADULT NURSE NOTE - PSH
S/P arthroscopy of knee  left 1996  S/P carpal tunnel release  2006  right  S/P eye surgery  2005 ptosis  S/P hysterectomy  1973  S/P lumbar laminectomy  2003  - pt states she was told she "threw a clot" during procedure and became hypotensive and bradycardic - she denies ever being placed on anticoagulants post-op; cannot identify where clot was located  S/P shoulder surgery  right  2005  S/P umbilical hernia repair, follow-up exam  1980s

## 2018-07-05 NOTE — ED PROVIDER NOTE - OBJECTIVE STATEMENT
Pt is a 84 yo female who presents to the ED with a cc of SOB.  PMHx of arthritis, HTN, gout, h/o hiatal hernia, GERD, IBS, LBBB, h/o lumbar stenosis, lymphadenopathy, obesity, h/o COPD.  Pt presents to the ED bradycardic in what appears to be complete heart block with rates in the low 20s to high teens, c/p SOB and lightheadedness.  Pt is not able to provide much history at this time.  Family reports that the pt feel May 19th and was diagnosed with a lumbar compression fracture.  She has surgery scheduled in the upcoming week and has completed pre op.  She has been c/o increasing SOB over the last several weeks and followed up with her PMD today and was prescribed Levaquin for bronchitis.  She had just placed the pill in her mouth when symptoms began.  Pt had c/o lightheadedness, SOB, nausea and had an episode of diarrhea prior to EMS arrival.    Per reports pt is on po Lasix but no supplemental potassium

## 2018-07-05 NOTE — ED PROVIDER NOTE - CRITICAL CARE PROVIDED
interpretation of diagnostic studies/consult w/ pt's family directly relating to pts condition/additional history taking/consultation with other physicians/direct patient care (not related to procedure)

## 2018-07-05 NOTE — H&P ADULT - NSHPLABSRESULTS_GEN_ALL_CORE
07-05    136  |  104  |  52<H>  ----------------------------<  61<L>  6.6<HH>   |  16<L>  |  1.32<H>  07-05    134<L>  |  107  |  54<H>  ----------------------------<  185<H>  7.0<HH>   |  17<L>  |  1.50<H>    Ca    10.7<H>      05 Jul 2018 22:25  Ca    9.7      05 Jul 2018 19:21  Phos  3.6     07-05  Mg     1.9     07-05    TPro  6.9  /  Alb  3.7  /  TBili  0.4  /  DBili  x   /  AST  34  /  ALT  43  /  AlkPhos  153<H>  07-05  TPro  7.6  /  Alb  3.5  /  TBili  0.4  /  DBili  x   /  AST  40<H>  /  ALT  53  /  AlkPhos  176<H>  07-05      PT/INR - ( 05 Jul 2018 19:21 )   PT: 11.2 sec;   INR: 1.03 ratio         PTT - ( 05 Jul 2018 19:21 )  PTT:31.6 sec                                        12.8   6.1   )-----------( 205      ( 05 Jul 2018 22:25 )             36.4                         12.6   8.27  )-----------( 283      ( 05 Jul 2018 19:21 )             36.9     CAPILLARY BLOOD GLUCOSE      POCT Blood Glucose.: 82 mg/dL (05 Jul 2018 22:54)  POCT Blood Glucose.: 135 mg/dL (05 Jul 2018 19:54)

## 2018-07-05 NOTE — CONSULT NOTE ADULT - SUBJECTIVE AND OBJECTIVE BOX
CHIEF COMPLAINT:    HISTORY OF PRESENT ILLNESS:      Allergies    Avelox (Rash)  Ceclor (Rash)  Duragesic-25 (Blisters)  Keflex (Rash)  penicillins (Anaphylaxis)  Zonegran (Rash)    Intolerances    	    MEDICATIONS:    vancomycin  IVPB 1000 milliGRAM(s) IV Intermittent once            lidocaine   Patch 1 Patch Transdermal daily      PAST MEDICAL & SURGICAL HISTORY:  Irritable bowel syndrome with both constipation and diarrhea  Hiatal hernia with GERD  Arthralgia of both knees  Lumbar stenosis  Lymphadenopathy  Obesity  Pulmonary emphysema, unspecified emphysema type  Gout  Essential hypertension  LBBB (left bundle branch block)  S/P shoulder surgery: right  2005  S/P carpal tunnel release: 2006  right  S/P lumbar laminectomy: 2003  - pt states she was told she &quot;threw a clot&quot; during procedure and became hypotensive and bradycardic - she denies ever being placed on anticoagulants post-op; cannot identify where clot was located  S/P eye surgery: 2005 ptosis  S/P umbilical hernia repair, follow-up exam: 1980s  S/P arthroscopy of knee: left 1996  S/P hysterectomy: 1973      FAMILY HISTORY:      SOCIAL HISTORY:    [ ] Non-smoker  [ ] Smoker  [ ] Alcohol      REVIEW OF SYSTEMS:  General: no fatigue/malaise, weight loss/gain.  Skin: no rashes.  Ophthalmologic: no blurred vision, no loss of vision. 	  ENT: no sore throat, rhinorrhea, sinus congestion.  Respiratory: no SOB, cough or wheeze.  Gastrointestinal:  no N/V/D, no melena/hematemesis/hematochezia.  Genitourinary: no dysuria/hesitancy or hematuria.  Musculoskeletal: no myalgias or arthralgias.  Neurological: no changes in vision or hearing, no lightheadedness/dizziness, no syncope/near syncope	  Psychiatric: no unusual stress/anxiety.   Hematology/Lymphatics: no unusual bleeding, bruising and no lymphadenopathy.  Endocrine: no unusual thirst.   All others negative except as stated above and in HPI.    PHYSICAL EXAM:  T(C): 36.8 (07-05-18 @ 21:35), Max: 36.8 (07-05-18 @ 21:35)  HR: 90 (07-05-18 @ 22:25) (30 - 100)  BP: 106/74 (07-05-18 @ 22:25) (85/54 - 126/69)  RR: 22 (07-05-18 @ 22:25) (14 - 26)  SpO2: 93% (07-05-18 @ 22:25) (93% - 98%)  Wt(kg): --  I&O's Summary      Appearance: Normal	  HEENT:   Normal oral mucosa  Cardiovascular: normal rate, regular rhythm, audible S1 and S2, no murmurs, rubs, or gallops, no JVD, no edema  Respiratory: Lungs clear to auscultation	  Psychiatry: Mood & affect appropriate  Gastrointestinal:  Soft  Skin: No rashes, No ecchymoses, No cyanosis	  Neurologic: Non-focal  Extremities: No clubbing, cyanosis or edema  Vascular: Peripheral pulses palpable         LABS:	 	    CBC Full  -  ( 05 Jul 2018 19:21 )  WBC Count : 8.27 K/uL  Hemoglobin : 12.6 g/dL  Hematocrit : 36.9 %  Platelet Count - Automated : 283 K/uL  Mean Cell Volume : 89.1 fl  Mean Cell Hemoglobin : 30.4 pg  Mean Cell Hemoglobin Concentration : 34.1 gm/dL  Auto Neutrophil # : 5.46 K/uL  Auto Lymphocyte # : 1.77 K/uL  Auto Monocyte # : 0.79 K/uL  Auto Eosinophil # : 0.17 K/uL  Auto Basophil # : 0.04 K/uL  Auto Neutrophil % : 65.9 %  Auto Lymphocyte % : 21.4 %  Auto Monocyte % : 9.6 %  Auto Eosinophil % : 2.1 %  Auto Basophil % : 0.5 %    07-05    134<L>  |  107  |  54<H>  ----------------------------<  185<H>  7.0<HH>   |  17<L>  |  1.50<H>    Ca    9.7      05 Jul 2018 19:21    TPro  7.6  /  Alb  3.5  /  TBili  0.4  /  DBili  x   /  AST  40<H>  /  ALT  53  /  AlkPhos  176<H>  07-05      proBNP: Serum Pro-Brain Natriuretic Peptide: 396 pg/mL (07-05 @ 19:21)    Lipid Profile:   HgA1c:   TSH:       CARDIAC MARKERS:  Troponin I, Serum: <.015 ng/mL (07-05 @ 19:21)            TELEMETRY: 	    ECG:  	  RADIOLOGY:  OTHER: 	    PREVIOUS DIAGNOSTIC TESTING:    [ ] Echocardiogram:  [ ]  Catheterization:  [ ] Stress Test:  	  	  ASSESSMENT/PLAN: 	    1) Complete Heart Block - resolved, likely       Dimas Haywood  Cardiology Fellow  Consult Fellow carries in-house phone [phone]) from 7:30 am - 5:00 pm M - F  For non-urgent issues outside of the above time period, please feel free to contact me directly by text or call at [phone] CHIEF COMPLAINT: Dyspnea, Nausea, Malaise    HISTORY OF PRESENT ILLNESS: The Pt is an 82 y/o woman with HTN, COPD, LBBB, Gout, Liver Disease who presented to OSH ED with multiple complaints including malaise, and was found to have evidence of Complete Heart Block (tracings not currently available) with ventricular rates in the 20s - 30s. The patient was subcutaneously paced for about 10 minutes before she returned to Sinus Rhythm with 1:1 AV conduction, with resolution of her symptoms. She was transferred to [facility] for further management. The patient is prescribed a potassium supplement as an outpatient, and initial Potassium level at OSH ED was 7.0 (non-hemolyzed). She was treated for Hyperkalemia prior to transfer. Heart Block has not recurred at this point.       Allergies    Avelox (Rash)  Ceclor (Rash)  Duragesic-25 (Blisters)  Keflex (Rash)  penicillins (Anaphylaxis)  Zonegran (Rash)    Intolerances    	    MEDICATIONS:    vancomycin  IVPB 1000 milliGRAM(s) IV Intermittent once            lidocaine   Patch 1 Patch Transdermal daily      PAST MEDICAL & SURGICAL HISTORY:  Irritable bowel syndrome with both constipation and diarrhea  Hiatal hernia with GERD  Arthralgia of both knees  Lumbar stenosis  Lymphadenopathy  Obesity  Pulmonary emphysema, unspecified emphysema type  Gout  Essential hypertension  LBBB (left bundle branch block)  S/P shoulder surgery: right  2005  S/P carpal tunnel release: 2006  right  S/P lumbar laminectomy: 2003  - pt states she was told she &quot;threw a clot&quot; during procedure and became hypotensive and bradycardic - she denies ever being placed on anticoagulants post-op; cannot identify where clot was located  S/P eye surgery: 2005 ptosis  S/P umbilical hernia repair, follow-up exam: 1980s  S/P arthroscopy of knee: left 1996  S/P hysterectomy: 1973      FAMILY HISTORY: Non-contributory       SOCIAL HISTORY: Former-smoker      REVIEW OF SYSTEMS:  General: no fatigue/malaise, weight loss/gain.  Skin: no rashes.  Ophthalmologic: no blurred vision, no loss of vision. 	  ENT: no sore throat, rhinorrhea, sinus congestion.  Respiratory: no SOB, cough or wheeze.  Gastrointestinal:  no N/V/D, no melena/hematemesis/hematochezia.  Genitourinary: no dysuria/hesitancy or hematuria.  Musculoskeletal: no myalgias or arthralgias.  Neurological: no changes in vision or hearing, no lightheadedness/dizziness, no syncope/near syncope	  Psychiatric: no unusual stress/anxiety.   Hematology/Lymphatics: no unusual bleeding, bruising and no lymphadenopathy.  Endocrine: no unusual thirst.   All others negative except as stated above and in HPI.    PHYSICAL EXAM:  T(C): 36.8 (07-05-18 @ 21:35), Max: 36.8 (07-05-18 @ 21:35)  HR: 90 (07-05-18 @ 22:25) (30 - 100)  BP: 106/74 (07-05-18 @ 22:25) (85/54 - 126/69)  RR: 22 (07-05-18 @ 22:25) (14 - 26)  SpO2: 93% (07-05-18 @ 22:25) (93% - 98%)  Wt(kg): --  I&O's Summary      Appearance: Normal	  HEENT:   Normal oral mucosa  Cardiovascular: normal rate, regular rhythm, audible S1 and S2, no JVD, no edema  Respiratory: Lungs clear to auscultation	  Psychiatry: Mood & affect appropriate  Gastrointestinal:  Soft  Skin: No rashes, No ecchymoses, No cyanosis	  Neurologic: Non-focal  Extremities: No clubbing, cyanosis or edema  Vascular: Peripheral pulses palpable         LABS:	 	    CBC Full  -  ( 05 Jul 2018 19:21 )  WBC Count : 8.27 K/uL  Hemoglobin : 12.6 g/dL  Hematocrit : 36.9 %  Platelet Count - Automated : 283 K/uL  Mean Cell Volume : 89.1 fl  Mean Cell Hemoglobin : 30.4 pg  Mean Cell Hemoglobin Concentration : 34.1 gm/dL  Auto Neutrophil # : 5.46 K/uL  Auto Lymphocyte # : 1.77 K/uL  Auto Monocyte # : 0.79 K/uL  Auto Eosinophil # : 0.17 K/uL  Auto Basophil # : 0.04 K/uL  Auto Neutrophil % : 65.9 %  Auto Lymphocyte % : 21.4 %  Auto Monocyte % : 9.6 %  Auto Eosinophil % : 2.1 %  Auto Basophil % : 0.5 %    07-05    134<L>  |  107  |  54<H>  ----------------------------<  185<H>  7.0<HH>   |  17<L>  |  1.50<H>    Ca    9.7      05 Jul 2018 19:21    TPro  7.6  /  Alb  3.5  /  TBili  0.4  /  DBili  x   /  AST  40<H>  /  ALT  53  /  AlkPhos  176<H>  07-05      proBNP: Serum Pro-Brain Natriuretic Peptide: 396 pg/mL (07-05 @ 19:21)    CARDIAC MARKERS:  Troponin I, Serum: <.015 ng/mL (07-05 @ 19:21)      ECG: Sinus Rhythm, LBBB 	    OTHER: 	    PREVIOUS DIAGNOSTIC TESTING:      Echocardiogram:    5/21/2018: Mild Concentric LVH, Mild Segmental LV Dysfunction (Septal Hypokinesis), Minimal AI, Mild MR, Mild LA Enlargement, RVSP ~ 47 mm Hg    	  ASSESSMENT/PLAN:  82 y/o woman with HTN, COPD, LBBB, Gout, Liver Disease who presented to [facility] ED with multiple complaints including malaise, and was found to have evidence of Complete Heart Block. now resolved	    1) Complete Heart Block - resolved, occurred in setting of Hyperkalemia (which has a known etiology) with underlying conduction disease (LBBB)   - treat Hyperkalemia  - check TTE  - pacing pads in place (no indication for urgent TVP at this time)  - NPO for possible PPM, though this decision regarding need for PPM has not yet been established  - discussed with EP Attending Dr. Coleman Haywood  Cardiology Fellow  EP Consult Fellow carries in-house phone [phone]) from 7:30 am - 5:00 pm M - F, for urgent issues outside of these hours, please call [phone]

## 2018-07-05 NOTE — H&P ADULT - NSHPPHYSICALEXAM_GEN_ALL_CORE
Vital Signs Last 24 Hrs  T(C): 36.8 (07-05-18 @ 21:35), Max: 36.8 (07-05-18 @ 21:35)  T(F): 98.2 (07-05-18 @ 21:35), Max: 98.2 (07-05-18 @ 21:35)  HR: 90 (07-05-18 @ 22:25) (30 - 100)  BP: 106/74 (07-05-18 @ 22:25) (85/54 - 126/69)  BP(mean): 87 (07-05-18 @ 22:25) (75 - 88)  RR: 22 (07-05-18 @ 22:25) (14 - 26)  SpO2: 93% (07-05-18 @ 22:25) (93% - 98%)    PHYSICAL EXAM:  GENERAL: NAD  HEAD:  NCAT  EYES: EOMI, PERRLA  ENMT: No tonsillar erythema, exudates, or enlargement; Moist mucous membranes, Good dentition, No lesions  NECK: Supple, No JVD  CHEST/LUNG: Clear to percussion bilaterally; No rales, rhonchi, wheezing, or rubs  HEART: Regular rate and rhythm; No murmurs, rubs, or gallops  ABDOMEN: Soft, Nontender, Nondistended; Bowel sounds present  EXTREMITIES:  2+ Peripheral Pulses, No clubbing, cyanosis, or edema

## 2018-07-05 NOTE — ED ADULT NURSE NOTE - OBJECTIVE STATEMENT
progressive SOB x days today c/o dizzy weakness brought to ED HR 29 pt alert responding appropriately color pale

## 2018-07-05 NOTE — CONSULT NOTE ADULT - ASSESSMENT
82 yo female pmhx HTN, COPD, LBB, Gout, Liver disease biba from home with complaints of SOB, nausea, and sudden onset of feeling ill and seeing spots with Heart Block and hyperkalemia.

## 2018-07-05 NOTE — CONSULT NOTE ADULT - ATTENDING COMMENTS
seen and examined with fellow. I agree with H & P, A & P.  Given significant underlying conduction disease (first degree, LBBB/LAD) would proceed with EPS/HV study if no further HB.  D/w pt and .

## 2018-07-05 NOTE — CONSULT NOTE ADULT - PROBLEM SELECTOR RECOMMENDATION 9
Patient with external pacing pads, not currently being paced.    Being transferred to [facility] for further management.

## 2018-07-05 NOTE — ED ADULT TRIAGE NOTE - CHIEF COMPLAINT QUOTE
Patient daughter reports patient suddenly bercame dizzy and short of breath. Bradycardic HR 40. MD Chavez to bedside with team.

## 2018-07-05 NOTE — H&P ADULT - NSHPSOCIALHISTORY_GEN_ALL_CORE
Marital Status:  (  x )    (   ) Single    (   )    (  )   Occupation:   Lives with: (  ) alone  ( x ) children   ( x ) spouse   (  ) parents  (  ) other    Substance Use (street drugs): ( x ) never used  (  ) other:  Tobacco Usage:  (   ) never smoked   ( x  ) former smoker  - quit 25 years ago  Alcohol Usage: none

## 2018-07-05 NOTE — ED ADULT NURSE REASSESSMENT NOTE - NS ED NURSE REASSESS COMMENT FT1
pt brought to ED with report "SOB x days" with extreme dizzy/weakness today.  found to have HR 29   atropine 1 mg IV given x 2 pt placed immediately on external pacer.  +capture.  K found to be 7 Dr Patterson made aware   arrangements made pt transferred to [facility] CCU STAT via ALS ambulance.  pt remained A&O x3 thru out episode.  c/o pain from external pacer medicated for pain.  pt family present continuously updated pt/family on situation/pt condition.  at time of transfer external pacer on pt but pt heart beating independently BP stable.  Report provided to CCU KYUNG Velasquez

## 2018-07-05 NOTE — ED PROVIDER NOTE - PROGRESS NOTE DETAILS
Pt required transcutaneous pacing for several minutes but now in sinus tachycardia.  Results of labs noted, pt hyperkalemic which could be the cause of the heart block, treated.  Spoke with pt cardiologist Dr. Guzman requested transfer.  Pt accepted to [place] CCU Dr. Rodriguez accepting

## 2018-07-05 NOTE — ED ADULT NURSE NOTE - PMH
Arthralgia of both knees    Essential hypertension    Gout    Hiatal hernia with GERD    Irritable bowel syndrome with both constipation and diarrhea    LBBB (left bundle branch block)    Lumbar stenosis    Lymphadenopathy    Obesity    Pulmonary emphysema, unspecified emphysema type

## 2018-07-05 NOTE — CONSULT NOTE ADULT - SUBJECTIVE AND OBJECTIVE BOX
Patient is an 84 yo female pmhx HTN, COPD, LBB, Gout, Liver disease biba from home with complaints of SOB, nausea, and sudden onset of feeling ill and seeing spots.  Upon arrival to ED patient found to be lethargic, bradycardic to 30s, pacing pads placed on patient's chest and was being paced externally.  Upon my arrival to ED patient was no longer being paced with rates from high 90s to low 100s, was awake, alert and oriented and "feeling much better".  EKG shows left bundle branch block and wide QRS complexes over the anterior/septal leads.  Labs came back, significant for potassium of 7, serum CO2 of 17, BUN 54, Cr 1.5.  As per patient she takes hydrochlorothiazide and potassium supplementation at home.  At this time patient endorses feeling better.  Patient denies chest pain, palpitations, cough, hemoptysis, dizziness, change in vision, numbness/tingling, nausea, vomiting, diarrhea, constipation, fever, chills, recent illness or sick contacts.          PAST MEDICAL & SURGICAL HISTORY:  Irritable bowel syndrome with both constipation and diarrhea  Hiatal hernia with GERD  Arthralgia of both knees  Lumbar stenosis  Lymphadenopathy  Obesity  Pulmonary emphysema, unspecified emphysema type  Gout  Essential hypertension  LBBB (left bundle branch block)  S/P shoulder surgery: right  2005  S/P carpal tunnel release: 2006  right  S/P lumbar laminectomy: 2003  - pt states she was told she &quot;threw a clot&quot; during procedure and became hypotensive and bradycardic - she denies ever being placed on anticoagulants post-op; cannot identify where clot was located  S/P eye surgery: 2005 ptosis  S/P umbilical hernia repair, follow-up exam: 1980s  S/P arthroscopy of knee: left 1996  S/P hysterectomy: 1973    Allergies  Avelox (Rash)  Ceclor (Rash)  Duragesic-25 (Blisters)  Keflex (Rash)  penicillins (Anaphylaxis)  Zonegran (Rash)    FAMILY HISTORY:  Noncontributory     Social History:   Patient lives at home.  Previous smoking history.  Patient denies etoh or illicit drug use.     Review of Systems:  See HPI      Vitals During Exam:   HR: 102  BP: 112/78 mmHg  RR: 17  sPO2: 96% on NC    Physical Examination:    General: Patient resting in bed, appears comfortable.     HEENT: NC/AT, Pupils equal, reactive to light.  Symmetric.    PULM: Symmetrical thorax expansion upon respiration.  Clear to auscultation bilaterally, no significant sputum production appreciated.     CVS: +s1, +s2,  no murmurs, rubs, or gallops appreciated.     ABD: Soft, nondistended, nontender, normoactive bowel sounds, no masses appreciated.     EXT: Non-pitting edema of bilateral lower extremities, Cap refill <3 seconds     SKIN: Warm and well perfused, no rashes noted.    NEURO: Alert, oriented, interactive, nonfocal, moving all 4 extremities      Medications:  calcium gluconate IVPB 2 Gram(s) IV Intermittent Once  sodium chloride 0.9% Bolus 1000 milliLiter(s) IV Bolus once      ICU Vital Signs Last 24 Hrs  T(C): --  T(F): --  HR: 30 (05 Jul 2018 18:56) (30 - 30)  BP: --  BP(mean): --  ABP: --  ABP(mean): --  RR: 26 (05 Jul 2018 18:56) (26 - 26)  SpO2: --    Vital Signs Last 24 Hrs  T(C): --  T(F): --  HR: 30 (05 Jul 2018 18:56) (30 - 30)  BP: --  BP(mean): --  RR: 26 (05 Jul 2018 18:56) (26 - 26)  SpO2: --      LABS:                        12.6   8.27  )-----------( 283      ( 05 Jul 2018 19:21 )             36.9     07-05    134<L>  |  107  |  54<H>  ----------------------------<  185<H>  7.0<HH>   |  17<L>  |  1.50<H>    Ca    9.7      05 Jul 2018 19:21    TPro  7.6  /  Alb  3.5  /  TBili  0.4  /  DBili  x   /  AST  40<H>  /  ALT  53  /  AlkPhos  176<H>  07-05      CARDIAC MARKERS ( 05 Jul 2018 19:21 )  <.015 ng/mL / x     / 42 U/L / x     / 3.2 ng/mL      CAPILLARY BLOOD GLUCOSE  POCT Blood Glucose.: 135 mg/dL (05 Jul 2018 19:54)      PT/INR - ( 05 Jul 2018 19:21 )   PT: 11.2 sec;   INR: 1.03 ratio    PTT - ( 05 Jul 2018 19:21 )  PTT:31.6 sec      SUPPLEMENTAL O2: NC  LINES: Peripheral   HOWELL: N  PPx: N  CONTACT: N

## 2018-07-05 NOTE — ED PROVIDER NOTE - MEDICAL DECISION MAKING DETAILS
cbc, cmp, lipase, BNP, INR cardiac enzymes, EKG, chest x-ray, transcutaneous pacing, hyperkalemia tx

## 2018-07-06 LAB
ALBUMIN SERPL ELPH-MCNC: 3.4 G/DL — SIGNIFICANT CHANGE UP (ref 3.3–5)
ALBUMIN SERPL ELPH-MCNC: 3.5 G/DL — SIGNIFICANT CHANGE UP (ref 3.3–5)
ALBUMIN SERPL ELPH-MCNC: 3.6 G/DL — SIGNIFICANT CHANGE UP (ref 3.3–5)
ALP SERPL-CCNC: 133 U/L — HIGH (ref 40–120)
ALP SERPL-CCNC: 136 U/L — HIGH (ref 40–120)
ALP SERPL-CCNC: 140 U/L — HIGH (ref 40–120)
ALT FLD-CCNC: 34 U/L — SIGNIFICANT CHANGE UP (ref 10–45)
ALT FLD-CCNC: 35 U/L — SIGNIFICANT CHANGE UP (ref 10–45)
ALT FLD-CCNC: 36 U/L — SIGNIFICANT CHANGE UP (ref 10–45)
ANION GAP SERPL CALC-SCNC: 14 MMOL/L — SIGNIFICANT CHANGE UP (ref 5–17)
ANION GAP SERPL CALC-SCNC: 15 MMOL/L — SIGNIFICANT CHANGE UP (ref 5–17)
ANION GAP SERPL CALC-SCNC: 17 MMOL/L — SIGNIFICANT CHANGE UP (ref 5–17)
AST SERPL-CCNC: 22 U/L — SIGNIFICANT CHANGE UP (ref 10–40)
AST SERPL-CCNC: 26 U/L — SIGNIFICANT CHANGE UP (ref 10–40)
AST SERPL-CCNC: 30 U/L — SIGNIFICANT CHANGE UP (ref 10–40)
BILIRUB SERPL-MCNC: 0.4 MG/DL — SIGNIFICANT CHANGE UP (ref 0.2–1.2)
BILIRUB SERPL-MCNC: 0.4 MG/DL — SIGNIFICANT CHANGE UP (ref 0.2–1.2)
BILIRUB SERPL-MCNC: 0.5 MG/DL — SIGNIFICANT CHANGE UP (ref 0.2–1.2)
BLD GP AB SCN SERPL QL: NEGATIVE — SIGNIFICANT CHANGE UP
BUN SERPL-MCNC: 46 MG/DL — HIGH (ref 7–23)
BUN SERPL-MCNC: 48 MG/DL — HIGH (ref 7–23)
BUN SERPL-MCNC: 50 MG/DL — HIGH (ref 7–23)
CALCIUM SERPL-MCNC: 10.2 MG/DL — SIGNIFICANT CHANGE UP (ref 8.4–10.5)
CALCIUM SERPL-MCNC: 9.6 MG/DL — SIGNIFICANT CHANGE UP (ref 8.4–10.5)
CALCIUM SERPL-MCNC: 9.7 MG/DL — SIGNIFICANT CHANGE UP (ref 8.4–10.5)
CHLORIDE SERPL-SCNC: 102 MMOL/L — SIGNIFICANT CHANGE UP (ref 96–108)
CHLORIDE SERPL-SCNC: 103 MMOL/L — SIGNIFICANT CHANGE UP (ref 96–108)
CHLORIDE SERPL-SCNC: 103 MMOL/L — SIGNIFICANT CHANGE UP (ref 96–108)
CHOLEST SERPL-MCNC: 135 MG/DL — SIGNIFICANT CHANGE UP (ref 10–199)
CK MB CFR SERPL CALC: 4.2 NG/ML — HIGH (ref 0–3.8)
CK SERPL-CCNC: 32 U/L — SIGNIFICANT CHANGE UP (ref 25–170)
CO2 SERPL-SCNC: 17 MMOL/L — LOW (ref 22–31)
CO2 SERPL-SCNC: 17 MMOL/L — LOW (ref 22–31)
CO2 SERPL-SCNC: 19 MMOL/L — LOW (ref 22–31)
CREAT SERPL-MCNC: 1.14 MG/DL — SIGNIFICANT CHANGE UP (ref 0.5–1.3)
CREAT SERPL-MCNC: 1.25 MG/DL — SIGNIFICANT CHANGE UP (ref 0.5–1.3)
CREAT SERPL-MCNC: 1.31 MG/DL — HIGH (ref 0.5–1.3)
GLUCOSE SERPL-MCNC: 110 MG/DL — HIGH (ref 70–99)
GLUCOSE SERPL-MCNC: 113 MG/DL — HIGH (ref 70–99)
GLUCOSE SERPL-MCNC: 145 MG/DL — HIGH (ref 70–99)
HBA1C BLD-MCNC: 5.8 % — HIGH (ref 4–5.6)
HCT VFR BLD CALC: 34.8 % — SIGNIFICANT CHANGE UP (ref 34.5–45)
HDLC SERPL-MCNC: 70 MG/DL — SIGNIFICANT CHANGE UP (ref 40–125)
HGB BLD-MCNC: 12 G/DL — SIGNIFICANT CHANGE UP (ref 11.5–15.5)
LIPID PNL WITH DIRECT LDL SERPL: 56 MG/DL — SIGNIFICANT CHANGE UP
MAGNESIUM SERPL-MCNC: 1.7 MG/DL — SIGNIFICANT CHANGE UP (ref 1.6–2.6)
MCHC RBC-ENTMCNC: 31.5 PG — SIGNIFICANT CHANGE UP (ref 27–34)
MCHC RBC-ENTMCNC: 34.5 GM/DL — SIGNIFICANT CHANGE UP (ref 32–36)
MCV RBC AUTO: 91.3 FL — SIGNIFICANT CHANGE UP (ref 80–100)
PHOSPHATE SERPL-MCNC: 3.9 MG/DL — SIGNIFICANT CHANGE UP (ref 2.5–4.5)
PLATELET # BLD AUTO: 194 K/UL — SIGNIFICANT CHANGE UP (ref 150–400)
POTASSIUM SERPL-MCNC: 5.2 MMOL/L — SIGNIFICANT CHANGE UP (ref 3.5–5.3)
POTASSIUM SERPL-MCNC: 5.5 MMOL/L — HIGH (ref 3.5–5.3)
POTASSIUM SERPL-MCNC: 6 MMOL/L — HIGH (ref 3.5–5.3)
POTASSIUM SERPL-SCNC: 5.2 MMOL/L — SIGNIFICANT CHANGE UP (ref 3.5–5.3)
POTASSIUM SERPL-SCNC: 5.5 MMOL/L — HIGH (ref 3.5–5.3)
POTASSIUM SERPL-SCNC: 6 MMOL/L — HIGH (ref 3.5–5.3)
PROT SERPL-MCNC: 6.3 G/DL — SIGNIFICANT CHANGE UP (ref 6–8.3)
PROT SERPL-MCNC: 6.6 G/DL — SIGNIFICANT CHANGE UP (ref 6–8.3)
PROT SERPL-MCNC: 6.6 G/DL — SIGNIFICANT CHANGE UP (ref 6–8.3)
RBC # BLD: 3.81 M/UL — SIGNIFICANT CHANGE UP (ref 3.8–5.2)
RBC # FLD: 13.8 % — SIGNIFICANT CHANGE UP (ref 10.3–14.5)
RH IG SCN BLD-IMP: POSITIVE — SIGNIFICANT CHANGE UP
SODIUM SERPL-SCNC: 135 MMOL/L — SIGNIFICANT CHANGE UP (ref 135–145)
SODIUM SERPL-SCNC: 135 MMOL/L — SIGNIFICANT CHANGE UP (ref 135–145)
SODIUM SERPL-SCNC: 137 MMOL/L — SIGNIFICANT CHANGE UP (ref 135–145)
TOTAL CHOLESTEROL/HDL RATIO MEASUREMENT: 1.9 RATIO — LOW (ref 3.3–7.1)
TRIGL SERPL-MCNC: 45 MG/DL — SIGNIFICANT CHANGE UP (ref 10–149)
TROPONIN T, HIGH SENSITIVITY RESULT: 44 NG/L — SIGNIFICANT CHANGE UP (ref 0–51)
TSH SERPL-MCNC: 1.81 UIU/ML — SIGNIFICANT CHANGE UP (ref 0.27–4.2)
WBC # BLD: 4.6 K/UL — SIGNIFICANT CHANGE UP (ref 3.8–10.5)
WBC # FLD AUTO: 4.6 K/UL — SIGNIFICANT CHANGE UP (ref 3.8–10.5)

## 2018-07-06 PROCEDURE — 71045 X-RAY EXAM CHEST 1 VIEW: CPT | Mod: 26

## 2018-07-06 PROCEDURE — 93010 ELECTROCARDIOGRAM REPORT: CPT

## 2018-07-06 PROCEDURE — 99291 CRITICAL CARE FIRST HOUR: CPT

## 2018-07-06 PROCEDURE — 99233 SBSQ HOSP IP/OBS HIGH 50: CPT

## 2018-07-06 PROCEDURE — 99223 1ST HOSP IP/OBS HIGH 75: CPT | Mod: AI

## 2018-07-06 PROCEDURE — 93306 TTE W/DOPPLER COMPLETE: CPT | Mod: 26

## 2018-07-06 RX ORDER — ONDANSETRON 8 MG/1
4 TABLET, FILM COATED ORAL ONCE
Qty: 0 | Refills: 0 | Status: COMPLETED | OUTPATIENT
Start: 2018-07-06 | End: 2018-07-06

## 2018-07-06 RX ORDER — NYSTATIN CREAM 100000 [USP'U]/G
1 CREAM TOPICAL
Qty: 0 | Refills: 0 | Status: DISCONTINUED | OUTPATIENT
Start: 2018-07-06 | End: 2018-07-10

## 2018-07-06 RX ORDER — ALLOPURINOL 300 MG
100 TABLET ORAL DAILY
Qty: 0 | Refills: 0 | Status: DISCONTINUED | OUTPATIENT
Start: 2018-07-06 | End: 2018-07-10

## 2018-07-06 RX ORDER — ACETAMINOPHEN 500 MG
975 TABLET ORAL ONCE
Qty: 0 | Refills: 0 | Status: COMPLETED | OUTPATIENT
Start: 2018-07-06 | End: 2018-07-06

## 2018-07-06 RX ORDER — SIMVASTATIN 20 MG/1
20 TABLET, FILM COATED ORAL AT BEDTIME
Qty: 0 | Refills: 0 | Status: DISCONTINUED | OUTPATIENT
Start: 2018-07-06 | End: 2018-07-10

## 2018-07-06 RX ORDER — HEPARIN SODIUM 5000 [USP'U]/ML
5000 INJECTION INTRAVENOUS; SUBCUTANEOUS EVERY 8 HOURS
Qty: 0 | Refills: 0 | Status: DISCONTINUED | OUTPATIENT
Start: 2018-07-06 | End: 2018-07-10

## 2018-07-06 RX ORDER — BUDESONIDE AND FORMOTEROL FUMARATE DIHYDRATE 160; 4.5 UG/1; UG/1
2 AEROSOL RESPIRATORY (INHALATION)
Qty: 0 | Refills: 0 | Status: DISCONTINUED | OUTPATIENT
Start: 2018-07-06 | End: 2018-07-10

## 2018-07-06 RX ORDER — MONTELUKAST 4 MG/1
10 TABLET, CHEWABLE ORAL DAILY
Qty: 0 | Refills: 0 | Status: DISCONTINUED | OUTPATIENT
Start: 2018-07-06 | End: 2018-07-10

## 2018-07-06 RX ORDER — ALBUTEROL 90 UG/1
2.5 AEROSOL, METERED ORAL
Qty: 0 | Refills: 0 | Status: COMPLETED | OUTPATIENT
Start: 2018-07-06 | End: 2018-07-06

## 2018-07-06 RX ORDER — TIOTROPIUM BROMIDE 18 UG/1
1 CAPSULE ORAL; RESPIRATORY (INHALATION) DAILY
Qty: 0 | Refills: 0 | Status: DISCONTINUED | OUTPATIENT
Start: 2018-07-06 | End: 2018-07-10

## 2018-07-06 RX ORDER — ALBUTEROL 90 UG/1
2 AEROSOL, METERED ORAL EVERY 6 HOURS
Qty: 0 | Refills: 0 | Status: DISCONTINUED | OUTPATIENT
Start: 2018-07-06 | End: 2018-07-10

## 2018-07-06 RX ORDER — MAGNESIUM SULFATE 500 MG/ML
1 VIAL (ML) INJECTION ONCE
Qty: 0 | Refills: 0 | Status: COMPLETED | OUTPATIENT
Start: 2018-07-06 | End: 2018-07-06

## 2018-07-06 RX ORDER — PANTOPRAZOLE SODIUM 20 MG/1
40 TABLET, DELAYED RELEASE ORAL
Qty: 0 | Refills: 0 | Status: DISCONTINUED | OUTPATIENT
Start: 2018-07-06 | End: 2018-07-10

## 2018-07-06 RX ORDER — POLYETHYLENE GLYCOL 3350 17 G/17G
17 POWDER, FOR SOLUTION ORAL ONCE
Qty: 0 | Refills: 0 | Status: DISCONTINUED | OUTPATIENT
Start: 2018-07-06 | End: 2018-07-06

## 2018-07-06 RX ADMIN — BUDESONIDE AND FORMOTEROL FUMARATE DIHYDRATE 2 PUFF(S): 160; 4.5 AEROSOL RESPIRATORY (INHALATION) at 21:30

## 2018-07-06 RX ADMIN — BUDESONIDE AND FORMOTEROL FUMARATE DIHYDRATE 2 PUFF(S): 160; 4.5 AEROSOL RESPIRATORY (INHALATION) at 10:29

## 2018-07-06 RX ADMIN — Medication 975 MILLIGRAM(S): at 18:58

## 2018-07-06 RX ADMIN — ALBUTEROL 2.5 MILLIGRAM(S): 90 AEROSOL, METERED ORAL at 02:50

## 2018-07-06 RX ADMIN — PANTOPRAZOLE SODIUM 40 MILLIGRAM(S): 20 TABLET, DELAYED RELEASE ORAL at 07:43

## 2018-07-06 RX ADMIN — MONTELUKAST 10 MILLIGRAM(S): 4 TABLET, CHEWABLE ORAL at 11:44

## 2018-07-06 RX ADMIN — LIDOCAINE 1 PATCH: 4 CREAM TOPICAL at 21:30

## 2018-07-06 RX ADMIN — Medication 100 MILLIGRAM(S): at 11:44

## 2018-07-06 RX ADMIN — HEPARIN SODIUM 5000 UNIT(S): 5000 INJECTION INTRAVENOUS; SUBCUTANEOUS at 21:30

## 2018-07-06 RX ADMIN — TIOTROPIUM BROMIDE 1 CAPSULE(S): 18 CAPSULE ORAL; RESPIRATORY (INHALATION) at 11:38

## 2018-07-06 RX ADMIN — ONDANSETRON 4 MILLIGRAM(S): 8 TABLET, FILM COATED ORAL at 13:46

## 2018-07-06 RX ADMIN — LIDOCAINE 1 PATCH: 4 CREAM TOPICAL at 10:11

## 2018-07-06 RX ADMIN — HEPARIN SODIUM 5000 UNIT(S): 5000 INJECTION INTRAVENOUS; SUBCUTANEOUS at 14:53

## 2018-07-06 RX ADMIN — ONDANSETRON 4 MILLIGRAM(S): 8 TABLET, FILM COATED ORAL at 18:27

## 2018-07-06 RX ADMIN — Medication 975 MILLIGRAM(S): at 18:28

## 2018-07-06 RX ADMIN — NYSTATIN CREAM 1 APPLICATION(S): 100000 CREAM TOPICAL at 17:50

## 2018-07-06 RX ADMIN — ALBUTEROL 2.5 MILLIGRAM(S): 90 AEROSOL, METERED ORAL at 01:56

## 2018-07-06 RX ADMIN — SIMVASTATIN 20 MILLIGRAM(S): 20 TABLET, FILM COATED ORAL at 21:30

## 2018-07-06 RX ADMIN — Medication 1 TABLET(S): at 11:44

## 2018-07-06 RX ADMIN — Medication 600 MILLIGRAM(S): at 17:50

## 2018-07-06 RX ADMIN — Medication 100 GRAM(S): at 05:40

## 2018-07-06 NOTE — PROGRESS NOTE ADULT - SUBJECTIVE AND OBJECTIVE BOX
Date of Admission: 7/5/18     24H hour events: Patient reports feeling well this am. No complaints of CP, SOB, abdominal pain, nausea/vomiting or lightheadedness.     MEDICATIONS:  heparin  Injectable 5000 Unit(s) SubCutaneous every 8 hours    vancomycin  IVPB 1000 milliGRAM(s) IV Intermittent once    ALBUTerol    90 MICROgram(s) HFA Inhaler 2 Puff(s) Inhalation every 6 hours PRN  buDESOnide 160 MICROgram(s)/formoterol 4.5 MICROgram(s) Inhaler 2 Puff(s) Inhalation two times a day  guaiFENesin  milliGRAM(s) Oral every 12 hours  HYDROcodone/homatropine Syrup 5 milliLiter(s) Oral every 6 hours PRN  montelukast 10 milliGRAM(s) Oral daily  tiotropium 18 MICROgram(s) Capsule 1 Capsule(s) Inhalation daily  pantoprazole    Tablet 40 milliGRAM(s) Oral before breakfast  polyethylene glycol 3350 17 Gram(s) Oral once  allopurinol 100 milliGRAM(s) Oral daily  simvastatin 20 milliGRAM(s) Oral at bedtime  chlorhexidine 4% Liquid 1 Application(s) Topical once  lidocaine   Patch 1 Patch Transdermal daily  multivitamin 1 Tablet(s) Oral daily      REVIEW OF SYSTEMS:  Complete 10point ROS negative.    PHYSICAL EXAM:  T(C): 36.7 (07-06-18 @ 08:03), Max: 36.8 (07-05-18 @ 21:35)  HR: 78 (07-06-18 @ 09:21) (30 - 100)  BP: 117/54 (07-06-18 @ 09:21) (85/54 - 132/59)  RR: 26 (07-06-18 @ 09:21) (14 - 28)  SpO2: 97% (07-06-18 @ 09:21) (93% - 99%)  Wt(kg): --  I&O's Summary    05 Jul 2018 07:01  -  06 Jul 2018 07:00  --------------------------------------------------------  IN: 0 mL / OUT: 125 mL / NET: -125 mL        Appearance: Normal	  HEENT:   Normal oral mucosa, PERRL, EOMI	  Lymphatic: No lymphadenopathy  Cardiovascular: Normal S1 S2, No JVD, No murmurs, No edema  Respiratory: Lungs clear to auscultation	  Psychiatry: A & O x 3, Mood & affect appropriate  Gastrointestinal:  Soft, Non-tender, + BS	  Skin: No rashes, No ecchymoses, No cyanosis	  Neurologic: Non-focal  Extremities: Normal range of motion, No clubbing, cyanosis or edema  Vascular: Peripheral pulses palpable 2+ bilaterally        LABS:	 	    CBC Full  -  ( 06 Jul 2018 03:48 )  WBC Count : 4.6 K/uL  Hemoglobin : 12.0 g/dL  Hematocrit : 34.8 %  Platelet Count - Automated : 194 K/uL  Mean Cell Volume : 91.3 fl  Mean Cell Hemoglobin : 31.5 pg  Mean Cell Hemoglobin Concentration : 34.5 gm/dL      07-06    135  |  103  |  48<H>  ----------------------------<  113<H>  5.5<H>   |  17<L>  |  1.25  07-06    137  |  103  |  50<H>  ----------------------------<  110<H>  6.0<H>   |  17<L>  |  1.31<H>    Ca    9.7      06 Jul 2018 06:21  Ca    10.2      06 Jul 2018 03:48  Phos  3.9     07-06  Phos  3.6     07-05  Mg     1.7     07-06  Mg     1.9     07-05    TPro  6.3  /  Alb  3.4  /  TBili  0.4  /  DBili  x   /  AST  30  /  ALT  35  /  AlkPhos  136<H>  07-06  TPro  6.6  /  Alb  3.5  /  TBili  0.4  /  DBili  x   /  AST  26  /  ALT  36  /  AlkPhos  140<H>  07-06      proBNP: Serum Pro-Brain Natriuretic Peptide: 396 pg/mL (07-05 @ 19:21)    CARDIAC MARKERS:  Troponin I, Serum: <.015 ng/mL (07-05 @ 19:21)    TELEMETRY: 	    ECG:  	NSR HR 96, LAD, LBBB    PREVIOUS DIAGNOSTIC TESTING:      [ ]  Catheterization: from: Cardiac Cath Lab (07.08.10 @ 15:08) >  Coronary vessels: The coronary circulation is co-dominant.  LM:      LM: Normal.  LAD:      LAD: Normal.  CX:      Circumflex: Normal.  RCA:      RCA: Normal.  Complications: There were no complications.  Recommendations:  There are no corornary lesions to account for her dyspnea or abnormal  stress test. She had signficant hypertension during the test, with the mid  LV diastolic pressure being fairlly normal. Potential other causes of her  dysnea include her COPD, inablility to exercise, and morbid obesity.  Prepared and signed by  Anoop Lambert M.D.  Signed 07/08/2010 15:49:05    	  ASSESSMENT/PLAN: 	    84 yo woman w/PMHx HTN, COPD, LBBB, Gout, Liver Disease presented to OSH reporting malaise found to have CHB and significant hyperkalemia requiring temporary transcutaneous pacing CHB resolved after hyperkalemia management now w/persistent LBBB NSR 60-90s.      #Complete Heart Block now resolved; likely precipitated in setting of metabolic derangements (hyperkalemia)  - f/u TTE   - Repeat EKG NSR with LBBB  - Tele reviewed; NSR 60-90  - No plan for PPM at this time. Will monitor telemetry and consider EPS vs. ILR    Case d/w Dr. Lubna Yanez   [id number] Date of Admission: 7/5/18     24H hour events: Patient reports feeling well this am. No complaints of CP, SOB, abdominal pain, nausea/vomiting or lightheadedness.     MEDICATIONS:  heparin  Injectable 5000 Unit(s) SubCutaneous every 8 hours    vancomycin  IVPB 1000 milliGRAM(s) IV Intermittent once    ALBUTerol    90 MICROgram(s) HFA Inhaler 2 Puff(s) Inhalation every 6 hours PRN  buDESOnide 160 MICROgram(s)/formoterol 4.5 MICROgram(s) Inhaler 2 Puff(s) Inhalation two times a day  guaiFENesin  milliGRAM(s) Oral every 12 hours  HYDROcodone/homatropine Syrup 5 milliLiter(s) Oral every 6 hours PRN  montelukast 10 milliGRAM(s) Oral daily  tiotropium 18 MICROgram(s) Capsule 1 Capsule(s) Inhalation daily  pantoprazole    Tablet 40 milliGRAM(s) Oral before breakfast  polyethylene glycol 3350 17 Gram(s) Oral once  allopurinol 100 milliGRAM(s) Oral daily  simvastatin 20 milliGRAM(s) Oral at bedtime  chlorhexidine 4% Liquid 1 Application(s) Topical once  lidocaine   Patch 1 Patch Transdermal daily  multivitamin 1 Tablet(s) Oral daily      REVIEW OF SYSTEMS:  Complete 10point ROS negative.    PHYSICAL EXAM:  T(C): 36.7 (07-06-18 @ 08:03), Max: 36.8 (07-05-18 @ 21:35)  HR: 78 (07-06-18 @ 09:21) (30 - 100)  BP: 117/54 (07-06-18 @ 09:21) (85/54 - 132/59)  RR: 26 (07-06-18 @ 09:21) (14 - 28)  SpO2: 97% (07-06-18 @ 09:21) (93% - 99%)  Wt(kg): --  I&O's Summary    05 Jul 2018 07:01  -  06 Jul 2018 07:00  --------------------------------------------------------  IN: 0 mL / OUT: 125 mL / NET: -125 mL        Appearance: Normal	  HEENT:   Normal oral mucosa, PERRL, EOMI	  Lymphatic: No lymphadenopathy  Cardiovascular: Normal S1 S2, No JVD, No murmurs, No edema  Respiratory: Lungs clear to auscultation	  Psychiatry: A & O x 3, Mood & affect appropriate  Gastrointestinal:  Soft, Non-tender, + BS	  Skin: No rashes, No ecchymoses, No cyanosis	  Neurologic: Non-focal  Extremities: Normal range of motion, No clubbing, cyanosis or edema  Vascular: Peripheral pulses palpable 2+ bilaterally        LABS:	 	    CBC Full  -  ( 06 Jul 2018 03:48 )  WBC Count : 4.6 K/uL  Hemoglobin : 12.0 g/dL  Hematocrit : 34.8 %  Platelet Count - Automated : 194 K/uL  Mean Cell Volume : 91.3 fl  Mean Cell Hemoglobin : 31.5 pg  Mean Cell Hemoglobin Concentration : 34.5 gm/dL      07-06    135  |  103  |  48<H>  ----------------------------<  113<H>  5.5<H>   |  17<L>  |  1.25  07-06    137  |  103  |  50<H>  ----------------------------<  110<H>  6.0<H>   |  17<L>  |  1.31<H>    Ca    9.7      06 Jul 2018 06:21  Ca    10.2      06 Jul 2018 03:48  Phos  3.9     07-06  Phos  3.6     07-05  Mg     1.7     07-06  Mg     1.9     07-05    TPro  6.3  /  Alb  3.4  /  TBili  0.4  /  DBili  x   /  AST  30  /  ALT  35  /  AlkPhos  136<H>  07-06  TPro  6.6  /  Alb  3.5  /  TBili  0.4  /  DBili  x   /  AST  26  /  ALT  36  /  AlkPhos  140<H>  07-06      proBNP: Serum Pro-Brain Natriuretic Peptide: 396 pg/mL (07-05 @ 19:21)    CARDIAC MARKERS:  Troponin I, Serum: <.015 ng/mL (07-05 @ 19:21)    TELEMETRY: 	    ECG:  	NSR HR 96, LAD, LBBB    PREVIOUS DIAGNOSTIC TESTING:      [ ]  Catheterization: from: Cardiac Cath Lab (07.08.10 @ 15:08) >  Coronary vessels: The coronary circulation is co-dominant.  LM:      LM: Normal.  LAD:      LAD: Normal.  CX:      Circumflex: Normal.  RCA:      RCA: Normal.  Complications: There were no complications.  Recommendations:  There are no corornary lesions to account for her dyspnea or abnormal  stress test. She had signficant hypertension during the test, with the mid  LV diastolic pressure being fairlly normal. Potential other causes of her  dysnea include her COPD, inablility to exercise, and morbid obesity.  Prepared and signed by  Anoop Lambert M.D.  Signed 07/08/2010 15:49:05    	  ASSESSMENT/PLAN: 	    82 yo woman w/PMHx HTN, COPD, LBBB, Gout, Liver Disease presented to OSH reporting malaise found to have CHB and significant hyperkalemia requiring temporary transcutaneous pacing CHB resolved after hyperkalemia management now w/persistent LBBB NSR 60-90s.      #Complete Heart Block now resolved; likely precipitated in setting of metabolic derangements (hyperkalemia)  - f/u TTE   - Repeat EKG NSR with LBBB  - Tele reviewed; NSR 60-90  - Given concomitant left axis deviation and LBBB, concern for significant underlying conduction disease, will plan for EPS Monday 7/9, please make NPO at midnight Sunday.     Case d/w Dr. Lubna Yanez   [id number]

## 2018-07-06 NOTE — CONSULT NOTE ADULT - SUBJECTIVE AND OBJECTIVE BOX
CHIEF COMPLAINT: Patient is a 83y old  Female who presents with a chief complaint of transfer for PPM eval (05 Jul 2018 22:49)      HPI:  84 yo female pmhx HTN, COPD, LBBB, HFPEF, Gout, Liver disease biba from home with complaints of SOB, nausea, and sudden onset of feeling ill and seeing yellow spots.  She was seen at  and found to be in CHB with a K of &. Recently her internist Dr. Arevalo had given her lasix and K for lower extremity edema that appears to be related to venous insufficiency She was then noted to have LUCIUS and was instructed to stop the lasix but cont with the K.   Upon arrival to ED, patient found to be lethargic, bradycardic to 30s, pacing pads placed on patient's chest and was being paced externally, after which pt had high 90s to low 100s, was awake, alert and oriented and "feeling much better".  Pt received insulin and dextrose for hyperkalemia. Once hyperkalemia improved her CHB did not return. She was tx to  for possible TVP or PPM.     She states that today she is feeling much better. Denies any chest pain, dyspnea, palpitations, PND, orthopnea, near syncope, syncope, lower extremity edema, stroke like symptoms. She still is having a cough.       EKG: SR with LBBB    REVIEW OF SYSTEMS:   All other review of systems are negative unless indicated above    PAST MEDICAL & SURGICAL HISTORY:  Irritable bowel syndrome with both constipation and diarrhea  Hiatal hernia with GERD  Arthralgia of both knees  Lumbar stenosis  Lymphadenopathy  Obesity  Pulmonary emphysema, unspecified emphysema type  Gout  Essential hypertension  LBBB (left bundle branch block)  S/P shoulder surgery: right  2005  S/P carpal tunnel release: 2006  right  S/P lumbar laminectomy: 2003  - pt states she was told she &quot;threw a clot&quot; during procedure and became hypotensive and bradycardic - she denies ever being placed on anticoagulants post-op; cannot identify where clot was located  S/P eye surgery: 2005 ptosis  S/P umbilical hernia repair, follow-up exam: 1980s  S/P arthroscopy of knee: left 1996  S/P hysterectomy: 1973      SOCIAL HISTORY:  No tobacco, ethanol, or drug abuse.    FAMILY HISTORY:  No pertinent family history in first degree relatives    No family history of acute MI or sudden cardiac death.    MEDICATIONS  (STANDING):  allopurinol 100 milliGRAM(s) Oral daily  buDESOnide 160 MICROgram(s)/formoterol 4.5 MICROgram(s) Inhaler 2 Puff(s) Inhalation two times a day  chlorhexidine 4% Liquid 1 Application(s) Topical once  guaiFENesin  milliGRAM(s) Oral every 12 hours  heparin  Injectable 5000 Unit(s) SubCutaneous every 8 hours  lidocaine   Patch 1 Patch Transdermal daily  montelukast 10 milliGRAM(s) Oral daily  multivitamin 1 Tablet(s) Oral daily  pantoprazole    Tablet 40 milliGRAM(s) Oral before breakfast  polyethylene glycol 3350 17 Gram(s) Oral once  simvastatin 20 milliGRAM(s) Oral at bedtime  tiotropium 18 MICROgram(s) Capsule 1 Capsule(s) Inhalation daily  vancomycin  IVPB 1000 milliGRAM(s) IV Intermittent once    MEDICATIONS  (PRN):  ALBUTerol    90 MICROgram(s) HFA Inhaler 2 Puff(s) Inhalation every 6 hours PRN Shortness of Breath and/or Wheezing  HYDROcodone/homatropine Syrup 5 milliLiter(s) Oral every 6 hours PRN Cough      Allergies    Avelox (Rash)  Ceclor (Rash)  Duragesic-25 (Blisters)  Keflex (Rash)  penicillins (Anaphylaxis)  Zonegran (Rash)    Intolerances        Home meds:  Home Medications:  acetaminophen 325 mg oral tablet: 2 tab(s) orally every 6 hours, As needed, Mild Pain (1 - 3) (05 Jul 2018 19:45)  AcipHex 20 mg oral delayed release tablet: 1 tab(s) orally once a day (05 Jul 2018 20:18)  Advair Diskus 250 mcg-50 mcg inhalation powder: 1 puff(s) inhaled 2 times a day (05 Jul 2018 20:18)  allopurinol 100 mg oral tablet: 1 tab(s) orally once a day (05 Jul 2018 20:18)  Astelin 137 mcg/inh nasal spray: 1 spray(s) nasal once a day (05 Jul 2018 20:18)  Avapro 150 mg oral tablet: 1 tab(s) orally 2 times a day (05 Jul 2018 20:18)  CeleBREX 200 mg oral capsule: 1 cap(s) orally 2 times a day (05 Jul 2018 20:18)  Flonase 50 mcg/inh nasal spray: 1 spray(s) nasal once a day (at bedtime) (05 Jul 2018 20:18)  Lidoderm 5% topical film: Apply topically to affected area once a day (05 Jul 2018 19:45)  Mucinex 600 mg oral tablet, extended release: 1 tab(s) orally every 12 hours (05 Jul 2018 19:45)  potassium chloride 20 mEq oral tablet, extended release: 3 tab(s) orally once a day (05 Jul 2018 20:18)  Proventil HFA 90 mcg/inh inhalation aerosol: 2 puff(s) inhaled 4 times a day, As Needed (05 Jul 2018 20:18)  Singulair 10 mg oral tablet: 1 tab(s) orally once a day (at bedtime) (05 Jul 2018 20:18)  Spiriva 18 mcg inhalation capsule: 1 cap(s) inhaled once a day (05 Jul 2018 20:18)  Zocor 20 mg oral tablet: 1 tab(s) orally once a day (at bedtime) (05 Jul 2018 20:18)        VITAL SIGNS:   Vital Signs Last 24 Hrs  T(C): 36.8 (05 Jul 2018 21:35), Max: 36.8 (05 Jul 2018 21:35)  T(F): 98.2 (05 Jul 2018 21:35), Max: 98.2 (05 Jul 2018 21:35)  HR: 82 (06 Jul 2018 04:44) (30 - 100)  BP: 132/59 (06 Jul 2018 04:44) (85/54 - 132/59)  BP(mean): 93 (06 Jul 2018 04:44) (69 - 93)  RR: 27 (06 Jul 2018 04:44) (14 - 28)  SpO2: 97% (06 Jul 2018 04:44) (93% - 99%)    I&O's Summary    05 Jul 2018 07:01  -  06 Jul 2018 07:00  --------------------------------------------------------  IN: 0 mL / OUT: 125 mL / NET: -125 mL        On Exam:  TELE: SR w LBBB  Constitutional: NAD, awake and alert, well-developed  HEENT: Moist Mucous Membranes, Anicteric  Pulmonary: Decreased breath sounds b/l. No rales, crackles or wheeze appreciated.   Cardiovascular: Regular, S1 and S2, No murmurs, rubs, gallops or clicks  Gastrointestinal: Bowel Sounds present, soft, nontender.   Lymph: trace peripheral edema. No lymphadenopathy.  Skin: No visible rashes or ulcers. + varicosities in b/l lower ext  Psych:  Mood & affect appropriate    LABS: All Labs Reviewed:                        12.0   4.6   )-----------( 194      ( 06 Jul 2018 03:48 )             34.8                         12.8   6.1   )-----------( 205      ( 05 Jul 2018 22:25 )             36.4                         12.6   8.27  )-----------( 283      ( 05 Jul 2018 19:21 )             36.9     06 Jul 2018 06:21    135    |  103    |  48     ----------------------------<  113    5.5     |  17     |  1.25   06 Jul 2018 03:48    137    |  103    |  50     ----------------------------<  110    6.0     |  17     |  1.31   05 Jul 2018 22:25    136    |  104    |  52     ----------------------------<  61     6.6     |  16     |  1.32     Ca    9.7        06 Jul 2018 06:21  Ca    10.2       06 Jul 2018 03:48  Ca    10.7       05 Jul 2018 22:25  Phos  3.9       06 Jul 2018 03:48  Phos  3.6       05 Jul 2018 22:25  Mg     1.7       06 Jul 2018 03:48  Mg     1.9       05 Jul 2018 22:25    TPro  6.3    /  Alb  3.4    /  TBili  0.4    /  DBili  x      /  AST  30     /  ALT  35     /  AlkPhos  136    06 Jul 2018 06:21  TPro  6.6    /  Alb  3.5    /  TBili  0.4    /  DBili  x      /  AST  26     /  ALT  36     /  AlkPhos  140    06 Jul 2018 03:48  TPro  6.9    /  Alb  3.7    /  TBili  0.4    /  DBili  x      /  AST  34     /  ALT  43     /  AlkPhos  153    05 Jul 2018 22:25    PT/INR - ( 05 Jul 2018 19:21 )   PT: 11.2 sec;   INR: 1.03 ratio         PTT - ( 05 Jul 2018 19:21 )  PTT:31.6 sec  CARDIAC MARKERS ( 06 Jul 2018 03:48 )  x     / x     / 32 U/L / x     / 4.2 ng/mL  CARDIAC MARKERS ( 05 Jul 2018 22:25 )  x     / x     / 34 U/L / x     / 4.3 ng/mL  CARDIAC MARKERS ( 05 Jul 2018 19:21 )  <.015 ng/mL / x     / 42 U/L / x     / 3.2 ng/mL      Blood Culture:   07-05 @ 19:21  Pro Bnp 396        RADIOLOGY:    CXR prelim ?cephalization of the right lung

## 2018-07-06 NOTE — CONSULT NOTE ADULT - ATTENDING COMMENTS
Patient was seen and examined with CCU team. I agree with findings and plan. PPM today Patient was seen and examined with CCU team. I agree with findings and plan. PPM not needed. Possible d/c today

## 2018-07-06 NOTE — CONSULT NOTE ADULT - ASSESSMENT
84 yo female pmhx HTN, COPD, LBBB, HFPEF, Gout p/w CHB in setting of Edilberto and Hyperkalemia.   - Currently with near resolution of hyperkalemia she has maintained SR. I would defer PPM for now unless tele revealing further episodes of CHB.   - Awaiting final EP input. ?need for a conduction study given baseline LBBB or a loop recorder.   - Appears clinically compensated from a HF POV. Though CXR with some possible cephalization on right side but image is rotate. Would defer diuretics for now.   - Avoid all nephrotoxic agents.   - Cont to hold Avapro  - Please continue to maintain strict I/Os, monitor daily weights, Cr, and K.   - Monitor hemodynamics closely  - Doubt sx are from CAD. trop neg and cath in 2010 with nonobs disease.   - Further cardiac workup will depend on clinical course.   - Monitor and replete electrolytes. Keep K>4.0 and Mg>2.0.   - Further cardiac workup will depend on clinical course.   Case d/w CCU team  Patient at high risk for decompensation. >35 minutes of critical care time was spent with this patient.

## 2018-07-06 NOTE — PROGRESS NOTE ADULT - SUBJECTIVE AND OBJECTIVE BOX
HPI:   82 yo female pmhx HTN, COPD, LBB, Gout, Liver disease biba from home with complaints of SOB, nausea, and sudden onset of feeling ill and seeing yellow spots.  Upon arrival to ED, patient found to be lethargic, bradycardic to 30s, pacing pads placed on patient's chest and was being paced externally, after which pt had high 90s to low 100s, was awake, alert and oriented and "feeling much better".  EKG shows left bundle branch block and wide QRS complexes over the anterior/septal leads.  Labs significant for potassium of 7, serum CO2 of 17, BUN 54, Cr 1.5.  As per patient she takes hydrochlorothiazide and potassium supplementation at home.  At this time patient endorses feeling better.  Patient denies chest pain, palpitations, cough, hemoptysis, dizziness, change in vision, numbness/tingling, nausea, vomiting, diarrhea, constipation, fever, chills, recent illness or sick contacts.      Interval Events: Pt does not endorse CP, SOB, dizziness, N/V. Patient's K has stabilized.      Subjective:     Physical:  ICU Vital Signs Last 24 Hrs  T(C): 36.8 (06 Jul 2018 16:00), Max: 36.8 (05 Jul 2018 21:35)  T(F): 98.3 (06 Jul 2018 16:00), Max: 98.3 (06 Jul 2018 16:00)  HR: 64 (06 Jul 2018 16:00) (30 - 100)  BP: 114/102 (06 Jul 2018 16:00) (85/54 - 178/88)  BP(mean): 107 (06 Jul 2018 16:00) (69 - 124)  ABP: --  ABP(mean): --  RR: 16 (06 Jul 2018 16:00) (14 - 28)  SpO2: 98% (06 Jul 2018 13:00) (93% - 99%)      Telemetry:   EKG:    Exam:  	GENERAL: NAD  	HEAD:  NCAT  	EYES: EOMI, PERRLA  	ENMT: No tonsillar erythema, exudates, or enlargement; Moist mucous membranes, Good dentition, No lesions  	NECK: Supple, No JVD  	CHEST/LUNG: Clear to percussion bilaterally; No rales, rhonchi, wheezing, or rubs  	HEART: Regular rate and rhythm; No murmurs, rubs, or gallops  	ABDOMEN: Soft, Nontender, Nondistended; Bowel sounds present  EXTREMITIES:  2+ Peripheral Pulses, No clubbing, cyanosis, or edema    LABS:  CAPILLARY BLOOD GLUCOSE      POCT Blood Glucose.: 82 mg/dL (05 Jul 2018 22:54)  POCT Blood Glucose.: 135 mg/dL (05 Jul 2018 19:54)    I&O's Summary    05 Jul 2018 07:01  -  06 Jul 2018 07:00  --------------------------------------------------------  IN: 0 mL / OUT: 125 mL / NET: -125 mL    06 Jul 2018 07:01  -  06 Jul 2018 17:04  --------------------------------------------------------  IN: 290 mL / OUT: 700 mL / NET: -410 mL                              12.0   4.6   )-----------( 194      ( 06 Jul 2018 03:48 )             34.8     WBC Trend: 4.6<--, 6.1<--, 8.27<--  07-06    135  |  102  |  46<H>  ----------------------------<  145<H>  5.2   |  19<L>  |  1.14    Ca    9.6      06 Jul 2018 11:56  Phos  3.9     07-06  Mg     1.7     07-06    TPro  6.6  /  Alb  3.6  /  TBili  0.5  /  DBili  x   /  AST  22  /  ALT  34  /  AlkPhos  133<H>  07-06    Creatinine Trend: 1.14<--, 1.25<--, 1.31<--, 1.32<--, 1.50<--  PT/INR - ( 05 Jul 2018 19:21 )   PT: 11.2 sec;   INR: 1.03 ratio         PTT - ( 05 Jul 2018 19:21 )  PTT:31.6 sec  CARDIAC MARKERS ( 06 Jul 2018 03:48 )  x     / x     / 32 U/L / x     / 4.2 ng/mL  CARDIAC MARKERS ( 05 Jul 2018 22:25 )  x     / x     / 34 U/L / x     / 4.3 ng/mL  CARDIAC MARKERS ( 05 Jul 2018 19:21 )  <.015 ng/mL / x     / 42 U/L / x     / 3.2 ng/mL          Imaging:        MEDICATIONS  (STANDING):  allopurinol 100 milliGRAM(s) Oral daily  buDESOnide 160 MICROgram(s)/formoterol 4.5 MICROgram(s) Inhaler 2 Puff(s) Inhalation two times a day  guaiFENesin  milliGRAM(s) Oral every 12 hours  heparin  Injectable 5000 Unit(s) SubCutaneous every 8 hours  lidocaine   Patch 1 Patch Transdermal daily  montelukast 10 milliGRAM(s) Oral daily  multivitamin 1 Tablet(s) Oral daily  nystatin Powder 1 Application(s) Topical two times a day  pantoprazole    Tablet 40 milliGRAM(s) Oral before breakfast  simvastatin 20 milliGRAM(s) Oral at bedtime  tiotropium 18 MICROgram(s) Capsule 1 Capsule(s) Inhalation daily    MEDICATIONS  (PRN):  ALBUTerol    90 MICROgram(s) HFA Inhaler 2 Puff(s) Inhalation every 6 hours PRN Shortness of Breath and/or Wheezing  HYDROcodone/homatropine Syrup 5 milliLiter(s) Oral every 6 hours PRN Cough      Consult Recommendations:

## 2018-07-06 NOTE — CHART NOTE - NSCHARTNOTEFT_GEN_A_CORE
CCU Transfer Note    Transfer from: CCU    Transfer to: (  ) Medicine    (  ) Telemetry     (   ) RCU        (    ) Palliative         (   ) Stroke Unit          (   ) __________________    Accepting Physician:  Signout given to:     CCU COURSE:  82 yo female pmhx HTN, COPD, LBB, Gout, Liver disease biba from home with complaints of SOB, nausea, and sudden onset of feeling ill and seeing yellow spots.  Upon arrival to ED, patient found to be lethargic, bradycardic to 30s, pacing pads placed on patient's chest and was being paced externally, after which pt had high 90s to low 100s, was awake, alert and oriented and "feeling much better".  EKG shows left bundle branch block and wide QRS complexes over the anterior/septal leads.  Labs significant for potassium of 7, serum CO2 of 17, BUN 54, Cr 1.5.  As per patient she takes hydrochlorothiazide and potassium supplementation at home.  At this time patient endorses feeling better.  Patient denies chest pain, palpitations, cough, hemoptysis, dizziness, change in vision, numbness/tingling, nausea, vomiting, diarrhea, constipation, fever, chills, recent illness or sick contacts. Pt received insulin and dextrose for hyperkalemia; repeat not done at OSH.  Pt transferred to [facility] for possible TVP and PPM; pt remains in NSR        ASSESSMENT & PLAN:   82 yo female pmhx HTN, COPD (emphysematous type), LBBB, IBS, and gout p/w n/v, and encephalopathy in the setting of hyperkalemia (K of 7) and CHB which resolved with transcutaneous pacing and insulin/dextrose, now in NSR and overall clinical improvement    # Neuro - no hx of CVA, AOx4  - no active issues    # Cardiac    1) CHB - resolved after transcutaneous pacing and insulin/dextrose/kayexalate x2/duoneb for hyperkalemia, more likely 2/2 electrolyte abnormality   - monitor BMP BID; K > 4; Mg > 2  - monitor on telemetry  - f/u EP recs; unlikely to need TVP or PPM now that CHB has resolved but will place TVP if pt returns to CHB   - treat Hyperkalemia  - check TTE  - pacing pads in place   - NPO for possible PPM - will establish necessity in the AM    2) LBBB - chronic, stable  - monitor on telemetry  - K > 4, Mg > 2    # Pulmonary    1) COPD  - c/w O2 NC  - c/w duonebs PRN  - monitor O2 sats; goal sat 88-92 to maintain respiratory drive    GI - hx of IBS w/ diarrhea and constipation, currently not having issues  - DASH-TLC diet  - will monitor BMs now that pt has received kayexalate and has a hx of IBS w/ diarrhea; will monitor BMP for K    Renal/Electrolytes/Metabolic - no hx of renal disease    1) Hyperkalemia - K now 6.6 from 7 s/p insulin and dextrose; Kayexalate and 1 amp of bicarb given  - trend BMP Q8 for Cr and electrolytes  - K > 4, Mg > 2    2) LUCIUS - Cr initially at 1.5, now at 1.32 likely pre-renal azotemia in the setting of poor PO intake and insensible losses c/b CHB and bradycardia  - trend BMP Q8 for Cr and electrolytes  - encourage PO intake  - renally dose all meds, avoid nephrotoxins, strict I&O, daily weights, avoid RCA    # Endocrine - no hx of DM or thyroid disease  - f/u A1c, TSH, and lipid profile    # Heme - no hx of hematologic disease, not anemic/thrombocytopenic  - trend CBC  - maintain active T&S; Hgb > 8     # Infectious Disease - not currently infected  - no active issues    # Ethics  - full code            FOR FOLLOW UP: CCU Transfer Note    Transfer from: CCU    Transfer to: (  ) Medicine    (  ) Telemetry     (   ) RCU        (    ) Palliative         (   ) Stroke Unit          (   ) __________________    Accepting Physician:  Signout given to:     CCU COURSE:  82 yo female pmhx HTN, COPD, LBB, Gout, Liver disease biba from home with complaints of SOB, nausea, and sudden onset of feeling ill and seeing yellow spots.  Upon arrival to ED, patient found to be lethargic, bradycardic to 30s, pacing pads placed on patient's chest and was being paced externally, after which pt had high 90s to low 100s, was awake, alert and oriented and "feeling much better".  EKG showed left bundle branch block and wide QRS complexes over the anterior/septal leads.  Labs significant for potassium of 7, serum CO2 of 17, BUN 54, Cr 1.5.  As per patient she takes hydrochlorothiazide and potassium supplementation at home. On admission, patient denied chest pain, palpitations, cough, hemoptysis, dizziness, change in vision, numbness/tingling, nausea, vomiting, diarrhea, constipation, fever, chills, recent illness or sick contacts. Pt received insulin and dextrose for hyperkalemia; repeat not done at OSH.  Pt transferred to [facility] for possible TVP and PPM. Pt found to be in CHB which resolved once transcutaneously paced and K of 7 treated with insulin/dextrose. Pt remains in NSR; K of 6.6 treated with an amp of bicarb, kayexalate and albuterol; pt had no BMs so another dose of kayexalate was given, repleted Mg w/ 1 g; K downtrended to 6 by 4:30 AM on 7/6 but still no BM. Plan for EP conduction study on Monday 7/9. PPM deferred despite resolution of hyperkalemia as pt has maintained SR. Patient currently feeling significantly better. Denies any chest pain, dyspnea, palpitations, PND, orthopnea, near syncope, syncope, lower extremity edema, stroke like symptoms.      ASSESSMENT & PLAN:   82 yo female pmhx HTN, COPD (emphysematous type), LBBB, IBS, and gout p/w n/v, and encephalopathy in the setting of hyperkalemia (K of 7) and CHB which resolved with transcutaneous pacing and insulin/dextrose, now in NSR and overall clinical improvement    # Neuro - no hx of CVA, AOx4  - no active issues    # Cardiac    1) CHB - resolved after transcutaneous pacing and insulin/dextrose/kayexalate x2/duoneb for hyperkalemia, more likely 2/2 electrolyte abnormality   - monitor BMP BID; K > 4; Mg > 2  - monitor on telemetry  - EP conduction study to take place 7/9    2) LBBB - chronic, stable  - monitor on telemetry  - K > 4, Mg > 2    # Pulmonary    1) COPD  - c/w O2 NC  - c/w duonebs PRN  - monitor O2 sats; goal sat 88-92 to maintain respiratory drive    GI - hx of IBS w/ diarrhea and constipation, currently not having issues  - DASH-TLC diet  - will monitor BMs now that pt has received kayexalate and has a hx of IBS w/ diarrhea; will monitor BMP for K    Renal/Electrolytes/Metabolic - no hx of renal disease    1) Hyperkalemia - K now 6.6 from 7 s/p insulin and dextrose; Kayexalate and 1 amp of bicarb given  - trend BMP Q8 for Cr and electrolytes  - K > 4, Mg > 2    2) LUCIUS - Cr initially at 1.5, now at 1.32 likely pre-renal azotemia in the setting of poor PO intake and insensible losses c/b CHB and bradycardia  - trend BMP Q8 for Cr and electrolytes  - encourage PO intake  - renally dose all meds, avoid nephrotoxins, strict I&O, daily weights, avoid RCA    # Endocrine - no hx of DM or thyroid disease  - f/u A1c, TSH, and lipid profile    # Heme - no hx of hematologic disease, not anemic/thrombocytopenic  - trend CBC  - maintain active T&S; Hgb > 8     # Infectious Disease - not currently infected  - no active issues    # Ethics  - full code    FOR FOLLOW-UP:  - EP study on 7/9  - monitor electrolytes, keep K>4.0 and Mg>2.0.

## 2018-07-07 DIAGNOSIS — I44.2 ATRIOVENTRICULAR BLOCK, COMPLETE: ICD-10-CM

## 2018-07-07 DIAGNOSIS — N17.9 ACUTE KIDNEY FAILURE, UNSPECIFIED: ICD-10-CM

## 2018-07-07 DIAGNOSIS — K58.2 MIXED IRRITABLE BOWEL SYNDROME: ICD-10-CM

## 2018-07-07 DIAGNOSIS — I44.7 LEFT BUNDLE-BRANCH BLOCK, UNSPECIFIED: ICD-10-CM

## 2018-07-07 DIAGNOSIS — M10.9 GOUT, UNSPECIFIED: ICD-10-CM

## 2018-07-07 DIAGNOSIS — J44.9 CHRONIC OBSTRUCTIVE PULMONARY DISEASE, UNSPECIFIED: ICD-10-CM

## 2018-07-07 LAB
ALBUMIN SERPL ELPH-MCNC: 3.5 G/DL — SIGNIFICANT CHANGE UP (ref 3.3–5)
ALP SERPL-CCNC: 135 U/L — HIGH (ref 40–120)
ALT FLD-CCNC: 30 U/L — SIGNIFICANT CHANGE UP (ref 10–45)
ANION GAP SERPL CALC-SCNC: 14 MMOL/L — SIGNIFICANT CHANGE UP (ref 5–17)
AST SERPL-CCNC: 18 U/L — SIGNIFICANT CHANGE UP (ref 10–40)
BILIRUB SERPL-MCNC: 0.4 MG/DL — SIGNIFICANT CHANGE UP (ref 0.2–1.2)
BUN SERPL-MCNC: 37 MG/DL — HIGH (ref 7–23)
CALCIUM SERPL-MCNC: 9.4 MG/DL — SIGNIFICANT CHANGE UP (ref 8.4–10.5)
CHLORIDE SERPL-SCNC: 101 MMOL/L — SIGNIFICANT CHANGE UP (ref 96–108)
CO2 SERPL-SCNC: 20 MMOL/L — LOW (ref 22–31)
CREAT SERPL-MCNC: 1.08 MG/DL — SIGNIFICANT CHANGE UP (ref 0.5–1.3)
GLUCOSE SERPL-MCNC: 102 MG/DL — HIGH (ref 70–99)
HCT VFR BLD CALC: 34 % — LOW (ref 34.5–45)
HGB BLD-MCNC: 11.3 G/DL — LOW (ref 11.5–15.5)
MAGNESIUM SERPL-MCNC: 1.8 MG/DL — SIGNIFICANT CHANGE UP (ref 1.6–2.6)
MCHC RBC-ENTMCNC: 29.9 PG — SIGNIFICANT CHANGE UP (ref 27–34)
MCHC RBC-ENTMCNC: 33.2 GM/DL — SIGNIFICANT CHANGE UP (ref 32–36)
MCV RBC AUTO: 89.9 FL — SIGNIFICANT CHANGE UP (ref 80–100)
PHOSPHATE SERPL-MCNC: 3.8 MG/DL — SIGNIFICANT CHANGE UP (ref 2.5–4.5)
PLATELET # BLD AUTO: 206 K/UL — SIGNIFICANT CHANGE UP (ref 150–400)
POTASSIUM SERPL-MCNC: 4.8 MMOL/L — SIGNIFICANT CHANGE UP (ref 3.5–5.3)
POTASSIUM SERPL-SCNC: 4.8 MMOL/L — SIGNIFICANT CHANGE UP (ref 3.5–5.3)
PROT SERPL-MCNC: 6.8 G/DL — SIGNIFICANT CHANGE UP (ref 6–8.3)
RBC # BLD: 3.78 M/UL — LOW (ref 3.8–5.2)
RBC # FLD: 15.5 % — HIGH (ref 10.3–14.5)
SODIUM SERPL-SCNC: 135 MMOL/L — SIGNIFICANT CHANGE UP (ref 135–145)
WBC # BLD: 5.28 K/UL — SIGNIFICANT CHANGE UP (ref 3.8–10.5)
WBC # FLD AUTO: 5.28 K/UL — SIGNIFICANT CHANGE UP (ref 3.8–10.5)

## 2018-07-07 PROCEDURE — 99233 SBSQ HOSP IP/OBS HIGH 50: CPT

## 2018-07-07 RX ORDER — ACETAMINOPHEN 500 MG
975 TABLET ORAL ONCE
Qty: 0 | Refills: 0 | Status: COMPLETED | OUTPATIENT
Start: 2018-07-07 | End: 2018-07-07

## 2018-07-07 RX ADMIN — HEPARIN SODIUM 5000 UNIT(S): 5000 INJECTION INTRAVENOUS; SUBCUTANEOUS at 05:40

## 2018-07-07 RX ADMIN — BUDESONIDE AND FORMOTEROL FUMARATE DIHYDRATE 2 PUFF(S): 160; 4.5 AEROSOL RESPIRATORY (INHALATION) at 07:53

## 2018-07-07 RX ADMIN — PANTOPRAZOLE SODIUM 40 MILLIGRAM(S): 20 TABLET, DELAYED RELEASE ORAL at 05:41

## 2018-07-07 RX ADMIN — HEPARIN SODIUM 5000 UNIT(S): 5000 INJECTION INTRAVENOUS; SUBCUTANEOUS at 13:47

## 2018-07-07 RX ADMIN — BUDESONIDE AND FORMOTEROL FUMARATE DIHYDRATE 2 PUFF(S): 160; 4.5 AEROSOL RESPIRATORY (INHALATION) at 21:55

## 2018-07-07 RX ADMIN — NYSTATIN CREAM 1 APPLICATION(S): 100000 CREAM TOPICAL at 21:56

## 2018-07-07 RX ADMIN — NYSTATIN CREAM 1 APPLICATION(S): 100000 CREAM TOPICAL at 05:41

## 2018-07-07 RX ADMIN — Medication 100 MILLIGRAM(S): at 11:47

## 2018-07-07 RX ADMIN — Medication 600 MILLIGRAM(S): at 05:40

## 2018-07-07 RX ADMIN — Medication 975 MILLIGRAM(S): at 07:08

## 2018-07-07 RX ADMIN — MONTELUKAST 10 MILLIGRAM(S): 4 TABLET, CHEWABLE ORAL at 11:46

## 2018-07-07 RX ADMIN — HEPARIN SODIUM 5000 UNIT(S): 5000 INJECTION INTRAVENOUS; SUBCUTANEOUS at 21:55

## 2018-07-07 RX ADMIN — Medication 600 MILLIGRAM(S): at 17:33

## 2018-07-07 RX ADMIN — LIDOCAINE 1 PATCH: 4 CREAM TOPICAL at 21:55

## 2018-07-07 RX ADMIN — SIMVASTATIN 20 MILLIGRAM(S): 20 TABLET, FILM COATED ORAL at 21:55

## 2018-07-07 RX ADMIN — Medication 1 TABLET(S): at 11:47

## 2018-07-07 RX ADMIN — TIOTROPIUM BROMIDE 1 CAPSULE(S): 18 CAPSULE ORAL; RESPIRATORY (INHALATION) at 13:46

## 2018-07-07 RX ADMIN — LIDOCAINE 1 PATCH: 4 CREAM TOPICAL at 11:49

## 2018-07-07 RX ADMIN — Medication 975 MILLIGRAM(S): at 01:03

## 2018-07-07 NOTE — PROGRESS NOTE ADULT - SUBJECTIVE AND OBJECTIVE BOX
[facility]    Oliva Evans, Thomas, Padmini, Shea, Ron, Jessie      [phone]    CHIEF COMPLAINT: Patient is a 83y old  Female who presents with a chief complaint of transfer for PPM eval (05 Jul 2018 22:49)      Follow Up: chb with dayna/hyperkalemia    Interim history: The patient reports no new symptoms.  Denies chest discomfort and shortness of breath.  No abdominal pain.  No new neurologic symptoms.      MEDICATIONS  (STANDING):  allopurinol 100 milliGRAM(s) Oral daily  buDESOnide 160 MICROgram(s)/formoterol 4.5 MICROgram(s) Inhaler 2 Puff(s) Inhalation two times a day  guaiFENesin  milliGRAM(s) Oral every 12 hours  heparin  Injectable 5000 Unit(s) SubCutaneous every 8 hours  lidocaine   Patch 1 Patch Transdermal daily  montelukast 10 milliGRAM(s) Oral daily  multivitamin 1 Tablet(s) Oral daily  nystatin Powder 1 Application(s) Topical two times a day  pantoprazole    Tablet 40 milliGRAM(s) Oral before breakfast  simvastatin 20 milliGRAM(s) Oral at bedtime  tiotropium 18 MICROgram(s) Capsule 1 Capsule(s) Inhalation daily    MEDICATIONS  (PRN):  ALBUTerol    90 MICROgram(s) HFA Inhaler 2 Puff(s) Inhalation every 6 hours PRN Shortness of Breath and/or Wheezing  HYDROcodone/homatropine Syrup 5 milliLiter(s) Oral every 6 hours PRN Cough      REVIEW OF SYSTEMS:  eye, ent, GI, , allergic, dermatologic, musculoskeletal and neurologic are negative except as described above    Vital Signs Last 24 Hrs  T(C): 36.4 (07 Jul 2018 04:15), Max: 36.8 (06 Jul 2018 16:00)  T(F): 97.5 (07 Jul 2018 04:15), Max: 98.3 (06 Jul 2018 16:00)  HR: 63 (07 Jul 2018 04:15) (62 - 82)  BP: 137/84 (07 Jul 2018 04:15) (104/66 - 178/88)  BP(mean): 93 (06 Jul 2018 18:14) (74 - 124)  RR: 18 (07 Jul 2018 04:15) (14 - 26)  SpO2: 96% (07 Jul 2018 04:15) (96% - 99%)    I&O's Summary    06 Jul 2018 07:01  -  07 Jul 2018 07:00  --------------------------------------------------------  IN: 530 mL / OUT: 1400 mL / NET: -870 mL        Telemetry past 24h: sr    PHYSICAL EXAM:    Constitutional: well-nourished, well-developed, NAD   HEENT:  MMM, sclerae anicteric, conjunctivae clear, no oral cyanosis.  Pulmonary: Non-labored, breath sounds are clear bilaterally, No wheezing, rales or rhonchi  Cardiovascular: Regular, S1 and S2.  No murmur.  No rubs, gallops or clicks  Gastrointestinal: Bowel Sounds present, soft, nontender.   Lymph: No peripheral edema.   Neurological: Alert, no focal deficits  Skin: No rashes.  Psych:  Mood & affect appropriate    LABS: All Labs Reviewed:                        11.3   5.28  )-----------( 206      ( 07 Jul 2018 06:38 )             34.0                         12.0   4.6   )-----------( 194      ( 06 Jul 2018 03:48 )             34.8                         12.8   6.1   )-----------( 205      ( 05 Jul 2018 22:25 )             36.4     07 Jul 2018 06:10    135    |  101    |  37     ----------------------------<  102    4.8     |  20     |  1.08   06 Jul 2018 11:56    135    |  102    |  46     ----------------------------<  145    5.2     |  19     |  1.14   06 Jul 2018 06:21    135    |  103    |  48     ----------------------------<  113    5.5     |  17     |  1.25     Ca    9.4        07 Jul 2018 06:10  Ca    9.6        06 Jul 2018 11:56  Ca    9.7        06 Jul 2018 06:21  Phos  3.8       07 Jul 2018 06:10  Phos  3.9       06 Jul 2018 03:48  Phos  3.6       05 Jul 2018 22:25  Mg     1.8       07 Jul 2018 06:10  Mg     1.7       06 Jul 2018 03:48  Mg     1.9       05 Jul 2018 22:25    TPro  6.8    /  Alb  3.5    /  TBili  0.4    /  DBili  x      /  AST  18     /  ALT  30     /  AlkPhos  135    07 Jul 2018 06:10  TPro  6.6    /  Alb  3.6    /  TBili  0.5    /  DBili  x      /  AST  22     /  ALT  34     /  AlkPhos  133    06 Jul 2018 11:56  TPro  6.3    /  Alb  3.4    /  TBili  0.4    /  DBili  x      /  AST  30     /  ALT  35     /  AlkPhos  136    06 Jul 2018 06:21    PT/INR - ( 05 Jul 2018 19:21 )   PT: 11.2 sec;   INR: 1.03 ratio         PTT - ( 05 Jul 2018 19:21 )  PTT:31.6 sec  CARDIAC MARKERS ( 06 Jul 2018 03:48 )  x     / x     / 32 U/L / x     / 4.2 ng/mL  CARDIAC MARKERS ( 05 Jul 2018 22:25 )  x     / x     / 34 U/L / x     / 4.3 ng/mL  CARDIAC MARKERS ( 05 Jul 2018 19:21 )  <.015 ng/mL / x     / 42 U/L / x     / 3.2 ng/mL      Blood Culture:   07-05 @ 19:21  Pro Bnp 396    07-06 @ 02:43  TSH: 1.81      RADIOLOGY:    EKG:    Echo:

## 2018-07-07 NOTE — CHART NOTE - NSCHARTNOTEFT_GEN_A_CORE
83 year-old F with PMH HTN, COPD, LBBB, IBS, and gout who presented with nausea/vomiting and encephalopathy in the setting of hyperkalemia (K of 7) and CHB which resolved with transcutaneous pacing and insulin/dextrose, now in NSR. Patient transferred out of the CCU pending EPS on 7/9    Plan:  -EPS on 7/9  -Monitor electrolytes, keep K>4 and Mg>2  -C/w O2 for COPD, currently on prednisone 20mg BID for two more days    Drake Jacobs, PGY-3  MAR  [id number] 83 year-old F with PMH HTN, COPD, LBBB, IBS, and gout who presented with nausea/vomiting and encephalopathy in the setting of hyperkalemia (K of 7) and CHB which resolved with transcutaneous pacing and insulin/dextrose, now in NSR. Patient transferred out of the CCU pending EPS on 7/9    Plan:  -EPS on 7/9, NPO@[place] on Sunday  -Monitor electrolytes, keep K>4 and Mg>2  -C/w O2 for COPD, currently on prednisone 20mg BID for two more days    Drake Jacobs, PGY-3  MAR  [id number] 83 year-old F with PMH HTN, COPD, LBBB, IBS, and gout who presented with nausea/vomiting and encephalopathy in the setting of hyperkalemia (K of 7) and CHB which resolved with transcutaneous pacing and insulin/dextrose, now in NSR. Patient transferred out of the CCU pending EPS on 7/9    Plan:  -EPS on 7/9, NPO@[place] on Sunday  -Monitor electrolytes, keep K>4 and Mg>2  -C/w O2 for COPD    Drake Jacobs, PGY-3  MAR  [id number]

## 2018-07-07 NOTE — PROGRESS NOTE ADULT - SUBJECTIVE AND OBJECTIVE BOX
Patient is a 83y old  Female who presents with a chief complaint of transfer for [facility] eval (05 Jul 2018 22:49)      SUBJECTIVE / OVERNIGHT EVENTS: No events. denied any chest pain, sob, tele with NSR     MEDICATIONS  (STANDING):  allopurinol 100 milliGRAM(s) Oral daily  buDESOnide 160 MICROgram(s)/formoterol 4.5 MICROgram(s) Inhaler 2 Puff(s) Inhalation two times a day  guaiFENesin  milliGRAM(s) Oral every 12 hours  heparin  Injectable 5000 Unit(s) SubCutaneous every 8 hours  lidocaine   Patch 1 Patch Transdermal daily  montelukast 10 milliGRAM(s) Oral daily  multivitamin 1 Tablet(s) Oral daily  nystatin Powder 1 Application(s) Topical two times a day  pantoprazole    Tablet 40 milliGRAM(s) Oral before breakfast  simvastatin 20 milliGRAM(s) Oral at bedtime  tiotropium 18 MICROgram(s) Capsule 1 Capsule(s) Inhalation daily    MEDICATIONS  (PRN):  ALBUTerol    90 MICROgram(s) HFA Inhaler 2 Puff(s) Inhalation every 6 hours PRN Shortness of Breath and/or Wheezing  HYDROcodone/homatropine Syrup 5 milliLiter(s) Oral every 6 hours PRN Cough      Vital Signs Last 24 Hrs  T(C): 36.4 (07 Jul 2018 04:15), Max: 36.8 (06 Jul 2018 16:00)  T(F): 97.5 (07 Jul 2018 04:15), Max: 98.3 (06 Jul 2018 16:00)  HR: 63 (07 Jul 2018 04:15) (62 - 82)  BP: 137/84 (07 Jul 2018 04:15) (112/64 - 178/88)  BP(mean): 93 (06 Jul 2018 18:14) (89 - 124)  RR: 18 (07 Jul 2018 04:15) (14 - 18)  SpO2: 96% (07 Jul 2018 04:15) (96% - 99%)  CAPILLARY BLOOD GLUCOSE        I&O's Summary    06 Jul 2018 07:01  -  07 Jul 2018 07:00  --------------------------------------------------------  IN: 530 mL / OUT: 1400 mL / NET: -870 mL        PHYSICAL EXAM:  GENERAL: NAD, well-developed  EYES: EOMI, PERRLA, conjunctiva and sclera clear  CHEST/LUNG: Clear to auscultation bilaterally; No wheeze  HEART: Regular rate and rhythm; No murmurs, rubs, or gallops, tele sinus   ABDOMEN: Soft, Nontender, Nondistended; Bowel sounds present  EXTREMITIES:  2+ Peripheral Pulses, No clubbing, cyanosis, or edema  PSYCH: AAOx3  NEUROLOGY: non-focal      LABS:                        11.3   5.28  )-----------( 206      ( 07 Jul 2018 06:38 )             34.0     07-07    135  |  101  |  37<H>  ----------------------------<  102<H>  4.8   |  20<L>  |  1.08    Ca    9.4      07 Jul 2018 06:10  Phos  3.8     07-07  Mg     1.8     07-07    TPro  6.8  /  Alb  3.5  /  TBili  0.4  /  DBili  x   /  AST  18  /  ALT  30  /  AlkPhos  135<H>  07-07    PT/INR - ( 05 Jul 2018 19:21 )   PT: 11.2 sec;   INR: 1.03 ratio         PTT - ( 05 Jul 2018 19:21 )  PTT:31.6 sec  CARDIAC MARKERS ( 06 Jul 2018 03:48 )  x     / x     / 32 U/L / x     / 4.2 ng/mL  CARDIAC MARKERS ( 05 Jul 2018 22:25 )  x     / x     / 34 U/L / x     / 4.3 ng/mL  CARDIAC MARKERS ( 05 Jul 2018 19:21 )  <.015 ng/mL / x     / 42 U/L / x     / 3.2 ng/mL          RADIOLOGY & ADDITIONAL TESTS:    Imaging Personally Reviewed: EKG from 7/9 personally reviewed - LBBB    Consultant(s) Notes Reviewed:      Care Discussed with Consultants/Other Providers:

## 2018-07-07 NOTE — PROGRESS NOTE ADULT - PROBLEM SELECTOR PLAN 1
Now on in sinus - likely secondary to metabolic abnormalities.  K is 4.8  continue to monitor on tele.  plan for EPS on Monday  NPO after midnight Sunday  EP note appreciated   keep K more 4

## 2018-07-08 LAB
ALBUMIN SERPL ELPH-MCNC: 3.3 G/DL — SIGNIFICANT CHANGE UP (ref 3.3–5)
ALP SERPL-CCNC: 125 U/L — HIGH (ref 40–120)
ALT FLD-CCNC: 24 U/L — SIGNIFICANT CHANGE UP (ref 10–45)
ANION GAP SERPL CALC-SCNC: 12 MMOL/L — SIGNIFICANT CHANGE UP (ref 5–17)
AST SERPL-CCNC: 14 U/L — SIGNIFICANT CHANGE UP (ref 10–40)
BILIRUB SERPL-MCNC: 0.3 MG/DL — SIGNIFICANT CHANGE UP (ref 0.2–1.2)
BUN SERPL-MCNC: 23 MG/DL — SIGNIFICANT CHANGE UP (ref 7–23)
CALCIUM SERPL-MCNC: 9.5 MG/DL — SIGNIFICANT CHANGE UP (ref 8.4–10.5)
CHLORIDE SERPL-SCNC: 100 MMOL/L — SIGNIFICANT CHANGE UP (ref 96–108)
CO2 SERPL-SCNC: 24 MMOL/L — SIGNIFICANT CHANGE UP (ref 22–31)
CREAT SERPL-MCNC: 0.84 MG/DL — SIGNIFICANT CHANGE UP (ref 0.5–1.3)
GLUCOSE SERPL-MCNC: 100 MG/DL — HIGH (ref 70–99)
HCT VFR BLD CALC: 34.3 % — LOW (ref 34.5–45)
HGB BLD-MCNC: 11.6 G/DL — SIGNIFICANT CHANGE UP (ref 11.5–15.5)
MCHC RBC-ENTMCNC: 30.1 PG — SIGNIFICANT CHANGE UP (ref 27–34)
MCHC RBC-ENTMCNC: 33.8 GM/DL — SIGNIFICANT CHANGE UP (ref 32–36)
MCV RBC AUTO: 88.9 FL — SIGNIFICANT CHANGE UP (ref 80–100)
PLATELET # BLD AUTO: 208 K/UL — SIGNIFICANT CHANGE UP (ref 150–400)
POTASSIUM SERPL-MCNC: 4.4 MMOL/L — SIGNIFICANT CHANGE UP (ref 3.5–5.3)
POTASSIUM SERPL-SCNC: 4.4 MMOL/L — SIGNIFICANT CHANGE UP (ref 3.5–5.3)
PROT SERPL-MCNC: 6.5 G/DL — SIGNIFICANT CHANGE UP (ref 6–8.3)
RBC # BLD: 3.86 M/UL — SIGNIFICANT CHANGE UP (ref 3.8–5.2)
RBC # FLD: 15.7 % — HIGH (ref 10.3–14.5)
SODIUM SERPL-SCNC: 136 MMOL/L — SIGNIFICANT CHANGE UP (ref 135–145)
WBC # BLD: 5.56 K/UL — SIGNIFICANT CHANGE UP (ref 3.8–10.5)
WBC # FLD AUTO: 5.56 K/UL — SIGNIFICANT CHANGE UP (ref 3.8–10.5)

## 2018-07-08 PROCEDURE — 99233 SBSQ HOSP IP/OBS HIGH 50: CPT

## 2018-07-08 PROCEDURE — 99232 SBSQ HOSP IP/OBS MODERATE 35: CPT

## 2018-07-08 RX ADMIN — Medication 100 MILLIGRAM(S): at 11:19

## 2018-07-08 RX ADMIN — LIDOCAINE 1 PATCH: 4 CREAM TOPICAL at 21:44

## 2018-07-08 RX ADMIN — BUDESONIDE AND FORMOTEROL FUMARATE DIHYDRATE 2 PUFF(S): 160; 4.5 AEROSOL RESPIRATORY (INHALATION) at 09:06

## 2018-07-08 RX ADMIN — HEPARIN SODIUM 5000 UNIT(S): 5000 INJECTION INTRAVENOUS; SUBCUTANEOUS at 14:24

## 2018-07-08 RX ADMIN — LIDOCAINE 1 PATCH: 4 CREAM TOPICAL at 09:07

## 2018-07-08 RX ADMIN — PANTOPRAZOLE SODIUM 40 MILLIGRAM(S): 20 TABLET, DELAYED RELEASE ORAL at 06:24

## 2018-07-08 RX ADMIN — NYSTATIN CREAM 1 APPLICATION(S): 100000 CREAM TOPICAL at 17:13

## 2018-07-08 RX ADMIN — Medication 600 MILLIGRAM(S): at 17:14

## 2018-07-08 RX ADMIN — MONTELUKAST 10 MILLIGRAM(S): 4 TABLET, CHEWABLE ORAL at 11:19

## 2018-07-08 RX ADMIN — HEPARIN SODIUM 5000 UNIT(S): 5000 INJECTION INTRAVENOUS; SUBCUTANEOUS at 05:12

## 2018-07-08 RX ADMIN — NYSTATIN CREAM 1 APPLICATION(S): 100000 CREAM TOPICAL at 05:12

## 2018-07-08 RX ADMIN — TIOTROPIUM BROMIDE 1 CAPSULE(S): 18 CAPSULE ORAL; RESPIRATORY (INHALATION) at 11:19

## 2018-07-08 RX ADMIN — BUDESONIDE AND FORMOTEROL FUMARATE DIHYDRATE 2 PUFF(S): 160; 4.5 AEROSOL RESPIRATORY (INHALATION) at 21:45

## 2018-07-08 RX ADMIN — Medication 1 TABLET(S): at 11:19

## 2018-07-08 RX ADMIN — HEPARIN SODIUM 5000 UNIT(S): 5000 INJECTION INTRAVENOUS; SUBCUTANEOUS at 21:44

## 2018-07-08 RX ADMIN — Medication 600 MILLIGRAM(S): at 05:12

## 2018-07-08 RX ADMIN — SIMVASTATIN 20 MILLIGRAM(S): 20 TABLET, FILM COATED ORAL at 21:44

## 2018-07-08 NOTE — PROGRESS NOTE ADULT - SUBJECTIVE AND OBJECTIVE BOX
[facility]    Oliva Evans, Thomas, Padmini, Shea, Ron, Jessie      [phone]    CHIEF COMPLAINT: Patient is a 83y old  Female who presents with a chief complaint of transfer for PPM eval (05 Jul 2018 22:49)      Follow Up: chb, hyperkalemia    Interim history: The patient reports no new symptoms.  Denies chest discomfort and shortness of breath.  No abdominal pain.  No new neurologic symptoms.      MEDICATIONS  (STANDING):  allopurinol 100 milliGRAM(s) Oral daily  buDESOnide 160 MICROgram(s)/formoterol 4.5 MICROgram(s) Inhaler 2 Puff(s) Inhalation two times a day  guaiFENesin  milliGRAM(s) Oral every 12 hours  heparin  Injectable 5000 Unit(s) SubCutaneous every 8 hours  lidocaine   Patch 1 Patch Transdermal daily  montelukast 10 milliGRAM(s) Oral daily  multivitamin 1 Tablet(s) Oral daily  nystatin Powder 1 Application(s) Topical two times a day  pantoprazole    Tablet 40 milliGRAM(s) Oral before breakfast  simvastatin 20 milliGRAM(s) Oral at bedtime  tiotropium 18 MICROgram(s) Capsule 1 Capsule(s) Inhalation daily    MEDICATIONS  (PRN):  ALBUTerol    90 MICROgram(s) HFA Inhaler 2 Puff(s) Inhalation every 6 hours PRN Shortness of Breath and/or Wheezing  HYDROcodone/homatropine Syrup 5 milliLiter(s) Oral every 6 hours PRN Cough      REVIEW OF SYSTEMS:  eye, ent, GI, , allergic, dermatologic, musculoskeletal and neurologic are negative except as described above    Vital Signs Last 24 Hrs  T(C): 36.4 (08 Jul 2018 04:59), Max: 36.6 (07 Jul 2018 20:58)  T(F): 97.5 (08 Jul 2018 04:59), Max: 97.8 (07 Jul 2018 20:58)  HR: 73 (08 Jul 2018 04:59) (63 - 78)  BP: 107/66 (08 Jul 2018 04:59) (107/66 - 133/84)  BP(mean): --  RR: 18 (08 Jul 2018 04:59) (18 - 18)  SpO2: 95% (08 Jul 2018 04:59) (95% - 98%)    I&O's Summary    07 Jul 2018 07:01  -  08 Jul 2018 07:00  --------------------------------------------------------  IN: 960 mL / OUT: 800 mL / NET: 160 mL        Telemetry past 24h: sr    PHYSICAL EXAM:    Constitutional: well-nourished, well-developed, NAD   HEENT:  MMM, sclerae anicteric, conjunctivae clear, no oral cyanosis.  Pulmonary: Non-labored, breath sounds are clear bilaterally, No wheezing, rales or rhonchi  Cardiovascular: Regular, S1 and S2.  No murmur.  No rubs, gallops or clicks  Gastrointestinal: Bowel Sounds present, soft, nontender.   Lymph: No peripheral edema.   Neurological: Alert, no focal deficits  Skin: No rashes.  Psych:  Mood & affect appropriate    LABS: All Labs Reviewed:                        11.6   5.56  )-----------( 208      ( 08 Jul 2018 07:15 )             34.3                         11.3   5.28  )-----------( 206      ( 07 Jul 2018 06:38 )             34.0                         12.0   4.6   )-----------( 194      ( 06 Jul 2018 03:48 )             34.8     08 Jul 2018 06:16    136    |  100    |  23     ----------------------------<  100    4.4     |  24     |  0.84   07 Jul 2018 06:10    135    |  101    |  37     ----------------------------<  102    4.8     |  20     |  1.08   06 Jul 2018 11:56    135    |  102    |  46     ----------------------------<  145    5.2     |  19     |  1.14     Ca    9.5        08 Jul 2018 06:16  Ca    9.4        07 Jul 2018 06:10  Ca    9.6        06 Jul 2018 11:56  Phos  3.8       07 Jul 2018 06:10  Phos  3.9       06 Jul 2018 03:48  Phos  3.6       05 Jul 2018 22:25  Mg     1.8       07 Jul 2018 06:10  Mg     1.7       06 Jul 2018 03:48  Mg     1.9       05 Jul 2018 22:25    TPro  6.5    /  Alb  3.3    /  TBili  0.3    /  DBili  x      /  AST  14     /  ALT  24     /  AlkPhos  125    08 Jul 2018 06:16  TPro  6.8    /  Alb  3.5    /  TBili  0.4    /  DBili  x      /  AST  18     /  ALT  30     /  AlkPhos  135    07 Jul 2018 06:10  TPro  6.6    /  Alb  3.6    /  TBili  0.5    /  DBili  x      /  AST  22     /  ALT  34     /  AlkPhos  133    06 Jul 2018 11:56          Blood Culture:   07-05 @ 19:21  Pro Bnp 396    07-06 @ 02:43  TSH: 1.81      RADIOLOGY:    EKG:    Echo:

## 2018-07-08 NOTE — PROGRESS NOTE ADULT - SUBJECTIVE AND OBJECTIVE BOX
Interval Events:    No acute events in last 24 hours. Patient without complaints.     ROS: Denies HA/dizziness/CP/SOB/palpitations/LE edema/abd pain    MEDICATIONS:  heparin  Injectable 5000 Unit(s) SubCutaneous every 8 hours      ALBUTerol    90 MICROgram(s) HFA Inhaler 2 Puff(s) Inhalation every 6 hours PRN  buDESOnide 160 MICROgram(s)/formoterol 4.5 MICROgram(s) Inhaler 2 Puff(s) Inhalation two times a day  guaiFENesin  milliGRAM(s) Oral every 12 hours  HYDROcodone/homatropine Syrup 5 milliLiter(s) Oral every 6 hours PRN  montelukast 10 milliGRAM(s) Oral daily  tiotropium 18 MICROgram(s) Capsule 1 Capsule(s) Inhalation daily      pantoprazole    Tablet 40 milliGRAM(s) Oral before breakfast    allopurinol 100 milliGRAM(s) Oral daily  simvastatin 20 milliGRAM(s) Oral at bedtime    lidocaine   Patch 1 Patch Transdermal daily  multivitamin 1 Tablet(s) Oral daily  nystatin Powder 1 Application(s) Topical two times a day      PHYSICAL EXAM:  T(C): 36.5 (07-08-18 @ 08:44), Max: 36.6 (07-07-18 @ 20:58)  HR: 78 (07-08-18 @ 12:00) (72 - 78)  BP: 132/69 (07-08-18 @ 12:00) (107/66 - 132/69)  RR: 17 (07-08-18 @ 08:44) (17 - 18)  SpO2: 96% (07-08-18 @ 12:00) (95% - 98%)  Wt(kg): --  I&O's Summary    07 Jul 2018 07:01  -  08 Jul 2018 07:00  --------------------------------------------------------  IN: 960 mL / OUT: 800 mL / NET: 160 mL    08 Jul 2018 07:01  -  08 Jul 2018 15:19  --------------------------------------------------------  IN: 540 mL / OUT: 350 mL / NET: 190 mL        Appearance: No Acute Distress  Cardiovascular: Normal S1 S2, RRR  Respiratory: Clear to auscultation  Gastrointestinal: Soft, Non-tender	  Neurologic: Non-focal  Extremities: No edema  Psychiatry: A & O x 3, Mood & affect appropriate    LABS:	 	    CBC Full  -  ( 08 Jul 2018 07:15 )  WBC Count : 5.56 K/uL  Hemoglobin : 11.6 g/dL  Hematocrit : 34.3 %  Platelet Count - Automated : 208 K/uL  Mean Cell Volume : 88.9 fl  Mean Cell Hemoglobin : 30.1 pg  Mean Cell Hemoglobin Concentration : 33.8 gm/dL  Auto Neutrophil # : x  Auto Lymphocyte # : x  Auto Monocyte # : x  Auto Eosinophil # : x  Auto Basophil # : x  Auto Neutrophil % : x  Auto Lymphocyte % : x  Auto Monocyte % : x  Auto Eosinophil % : x  Auto Basophil % : x    07-08    136  |  100  |  23  ----------------------------<  100<H>  4.4   |  24  |  0.84  07-07    135  |  101  |  37<H>  ----------------------------<  102<H>  4.8   |  20<L>  |  1.08    Ca    9.5      08 Jul 2018 06:16  Ca    9.4      07 Jul 2018 06:10  Phos  3.8     07-07  Mg     1.8     07-07    TPro  6.5  /  Alb  3.3  /  TBili  0.3  /  DBili  x   /  AST  14  /  ALT  24  /  AlkPhos  125<H>  07-08  TPro  6.8  /  Alb  3.5  /  TBili  0.4  /  DBili  x   /  AST  18  /  ALT  30  /  AlkPhos  135<H>  07-07      TELEMETRY: NSR

## 2018-07-08 NOTE — PHYSICAL THERAPY INITIAL EVALUATION ADULT - PERTINENT HX OF CURRENT PROBLEM, REHAB EVAL
84 yo F w c/o SOB, nausea, and sudden onset of feeling ill and seeing yellow spots. Pt found to be lethargic in ED, bradycardic to 30s, pacing pads placed on patient's chest and was being paced externally, after which pt had high 90s to low 100s, was awake, alert and oriented and "feeling much better".  EKG shows LBBB and wide QRS complexes over the anterior/septal leads.  Labs significant for potassium of 7, serum CO2 of 17, BUN 54, Cr 1.5. Pt here with CHB in setting of Edilberto and Hyperkalemia.

## 2018-07-08 NOTE — PROGRESS NOTE ADULT - PROBLEM SELECTOR PLAN 1
Now on in sinus - likely secondary to metabolic abnormalities.  K is 4.4.  which is resolved and she clinically and HD stable     plan for EPS on Monday  NPO after midnight Sunday  EP note appreciated   keep K more 4 Now on in sinus - likely secondary to metabolic abnormalities.  K is 4.4.  which is resolved and she clinically and HD stable   plan for EPS tomorrow  NPO after midnight   EP note appreciated   keep K more 4

## 2018-07-08 NOTE — PHYSICAL THERAPY INITIAL EVALUATION ADULT - ADDITIONAL COMMENTS
pt lives in private home with spouse, 1 step to enter, 6 additional steps inside to go upstairs and downstairs with 2 HRs. Prior to admission, pt amb I with RW. Pt admits to recent fall.

## 2018-07-08 NOTE — PHYSICAL THERAPY INITIAL EVALUATION ADULT - DISCHARGE DISPOSITION, PT EVAL
rehabilitation facility/subacute; if pt returns to home, pt will require assist with all functional mobility

## 2018-07-08 NOTE — PHYSICAL THERAPY INITIAL EVALUATION ADULT - PRECAUTIONS/LIMITATIONS, REHAB EVAL
fall precautions/XRay Chest 7/6: The heart is normal in size. Clear lungs. Degenerative changes in thoracic spine and both shoulders.

## 2018-07-08 NOTE — PROGRESS NOTE ADULT - SUBJECTIVE AND OBJECTIVE BOX
Patient is a 83y old  Female who presents with a chief complaint of transfer for [facility] eval (05 Jul 2018 22:49)      SUBJECTIVE / OVERNIGHT EVENTS: patient seen and examined . No new complaint. tele with sinus     MEDICATIONS  (STANDING):  allopurinol 100 milliGRAM(s) Oral daily  buDESOnide 160 MICROgram(s)/formoterol 4.5 MICROgram(s) Inhaler 2 Puff(s) Inhalation two times a day  guaiFENesin  milliGRAM(s) Oral every 12 hours  heparin  Injectable 5000 Unit(s) SubCutaneous every 8 hours  lidocaine   Patch 1 Patch Transdermal daily  montelukast 10 milliGRAM(s) Oral daily  multivitamin 1 Tablet(s) Oral daily  nystatin Powder 1 Application(s) Topical two times a day  pantoprazole    Tablet 40 milliGRAM(s) Oral before breakfast  simvastatin 20 milliGRAM(s) Oral at bedtime  tiotropium 18 MICROgram(s) Capsule 1 Capsule(s) Inhalation daily    MEDICATIONS  (PRN):  ALBUTerol    90 MICROgram(s) HFA Inhaler 2 Puff(s) Inhalation every 6 hours PRN Shortness of Breath and/or Wheezing  HYDROcodone/homatropine Syrup 5 milliLiter(s) Oral every 6 hours PRN Cough      Vital Signs Last 24 Hrs  T(C): 36.5 (08 Jul 2018 08:44), Max: 36.6 (07 Jul 2018 20:58)  T(F): 97.7 (08 Jul 2018 08:44), Max: 97.8 (07 Jul 2018 20:58)  HR: 78 (08 Jul 2018 12:00) (72 - 78)  BP: 132/69 (08 Jul 2018 12:00) (107/66 - 132/69)  BP(mean): --  RR: 17 (08 Jul 2018 08:44) (17 - 18)  SpO2: 96% (08 Jul 2018 12:00) (95% - 98%)  CAPILLARY BLOOD GLUCOSE        I&O's Summary    07 Jul 2018 07:01  -  08 Jul 2018 07:00  --------------------------------------------------------  IN: 960 mL / OUT: 800 mL / NET: 160 mL    08 Jul 2018 07:01  -  08 Jul 2018 12:20  --------------------------------------------------------  IN: 300 mL / OUT: 150 mL / NET: 150 mL        PHYSICAL EXAM:  GENERAL: NAD,   CHEST/LUNG: Clear to auscultation bilaterally; No wheeze  HEART: Regular rate and rhythm; No murmurs, rubs, or gallops, tele sinus   ABDOMEN: Soft, Nontender, Nondistended; Bowel sounds present+ ventral hernia   EXTREMITIES:  2+ Peripheral Pulses, No clubbing, cyanosis, or edema  PSYCH: AAOx3    LABS:                        11.6   5.56  )-----------( 208      ( 08 Jul 2018 07:15 )             34.3     07-08    136  |  100  |  23  ----------------------------<  100<H>  4.4   |  24  |  0.84    Ca    9.5      08 Jul 2018 06:16  Phos  3.8     07-07  Mg     1.8     07-07    TPro  6.5  /  Alb  3.3  /  TBili  0.3  /  DBili  x   /  AST  14  /  ALT  24  /  AlkPhos  125<H>  07-08              RADIOLOGY & ADDITIONAL TESTS:    Imaging Personally Reviewed:    Consultant(s) Notes Reviewed:      Care Discussed with Consultants/Other Providers:

## 2018-07-09 ENCOUNTER — TRANSCRIPTION ENCOUNTER (OUTPATIENT)
Age: 83
End: 2018-07-09

## 2018-07-09 DIAGNOSIS — Z29.9 ENCOUNTER FOR PROPHYLACTIC MEASURES, UNSPECIFIED: ICD-10-CM

## 2018-07-09 LAB
ALBUMIN SERPL ELPH-MCNC: 3.3 G/DL — SIGNIFICANT CHANGE UP (ref 3.3–5)
ALP SERPL-CCNC: 128 U/L — HIGH (ref 40–120)
ALT FLD-CCNC: 20 U/L — SIGNIFICANT CHANGE UP (ref 10–45)
ANION GAP SERPL CALC-SCNC: 14 MMOL/L — SIGNIFICANT CHANGE UP (ref 5–17)
AST SERPL-CCNC: 12 U/L — SIGNIFICANT CHANGE UP (ref 10–40)
BILIRUB SERPL-MCNC: 0.4 MG/DL — SIGNIFICANT CHANGE UP (ref 0.2–1.2)
BLD GP AB SCN SERPL QL: NEGATIVE — SIGNIFICANT CHANGE UP
BUN SERPL-MCNC: 23 MG/DL — SIGNIFICANT CHANGE UP (ref 7–23)
CALCIUM SERPL-MCNC: 9.2 MG/DL — SIGNIFICANT CHANGE UP (ref 8.4–10.5)
CHLORIDE SERPL-SCNC: 102 MMOL/L — SIGNIFICANT CHANGE UP (ref 96–108)
CO2 SERPL-SCNC: 23 MMOL/L — SIGNIFICANT CHANGE UP (ref 22–31)
CREAT SERPL-MCNC: 0.83 MG/DL — SIGNIFICANT CHANGE UP (ref 0.5–1.3)
GLUCOSE SERPL-MCNC: 84 MG/DL — SIGNIFICANT CHANGE UP (ref 70–99)
HCT VFR BLD CALC: 33.8 % — LOW (ref 34.5–45)
HGB BLD-MCNC: 11 G/DL — LOW (ref 11.5–15.5)
MCHC RBC-ENTMCNC: 29 PG — SIGNIFICANT CHANGE UP (ref 27–34)
MCHC RBC-ENTMCNC: 32.5 GM/DL — SIGNIFICANT CHANGE UP (ref 32–36)
MCV RBC AUTO: 89.2 FL — SIGNIFICANT CHANGE UP (ref 80–100)
PLATELET # BLD AUTO: 216 K/UL — SIGNIFICANT CHANGE UP (ref 150–400)
POTASSIUM SERPL-MCNC: 4.3 MMOL/L — SIGNIFICANT CHANGE UP (ref 3.5–5.3)
POTASSIUM SERPL-SCNC: 4.3 MMOL/L — SIGNIFICANT CHANGE UP (ref 3.5–5.3)
PROT SERPL-MCNC: 6.3 G/DL — SIGNIFICANT CHANGE UP (ref 6–8.3)
RBC # BLD: 3.79 M/UL — LOW (ref 3.8–5.2)
RBC # FLD: 15.5 % — HIGH (ref 10.3–14.5)
RH IG SCN BLD-IMP: POSITIVE — SIGNIFICANT CHANGE UP
SODIUM SERPL-SCNC: 139 MMOL/L — SIGNIFICANT CHANGE UP (ref 135–145)
WBC # BLD: 4.71 K/UL — SIGNIFICANT CHANGE UP (ref 3.8–10.5)
WBC # FLD AUTO: 4.71 K/UL — SIGNIFICANT CHANGE UP (ref 3.8–10.5)

## 2018-07-09 PROCEDURE — 99233 SBSQ HOSP IP/OBS HIGH 50: CPT

## 2018-07-09 PROCEDURE — 93010 ELECTROCARDIOGRAM REPORT: CPT

## 2018-07-09 RX ORDER — IBUPROFEN 200 MG
400 TABLET ORAL ONCE
Qty: 0 | Refills: 0 | Status: COMPLETED | OUTPATIENT
Start: 2018-07-09 | End: 2018-07-09

## 2018-07-09 RX ORDER — ACETAMINOPHEN 500 MG
650 TABLET ORAL ONCE
Qty: 0 | Refills: 0 | Status: COMPLETED | OUTPATIENT
Start: 2018-07-09 | End: 2018-07-09

## 2018-07-09 RX ADMIN — Medication 400 MILLIGRAM(S): at 22:42

## 2018-07-09 RX ADMIN — MONTELUKAST 10 MILLIGRAM(S): 4 TABLET, CHEWABLE ORAL at 11:01

## 2018-07-09 RX ADMIN — Medication 650 MILLIGRAM(S): at 20:07

## 2018-07-09 RX ADMIN — PANTOPRAZOLE SODIUM 40 MILLIGRAM(S): 20 TABLET, DELAYED RELEASE ORAL at 05:52

## 2018-07-09 RX ADMIN — NYSTATIN CREAM 1 APPLICATION(S): 100000 CREAM TOPICAL at 17:17

## 2018-07-09 RX ADMIN — HEPARIN SODIUM 5000 UNIT(S): 5000 INJECTION INTRAVENOUS; SUBCUTANEOUS at 05:52

## 2018-07-09 RX ADMIN — BUDESONIDE AND FORMOTEROL FUMARATE DIHYDRATE 2 PUFF(S): 160; 4.5 AEROSOL RESPIRATORY (INHALATION) at 11:01

## 2018-07-09 RX ADMIN — BUDESONIDE AND FORMOTEROL FUMARATE DIHYDRATE 2 PUFF(S): 160; 4.5 AEROSOL RESPIRATORY (INHALATION) at 21:43

## 2018-07-09 RX ADMIN — Medication 600 MILLIGRAM(S): at 05:52

## 2018-07-09 RX ADMIN — Medication 1 TABLET(S): at 11:01

## 2018-07-09 RX ADMIN — Medication 400 MILLIGRAM(S): at 21:43

## 2018-07-09 RX ADMIN — SIMVASTATIN 20 MILLIGRAM(S): 20 TABLET, FILM COATED ORAL at 21:43

## 2018-07-09 RX ADMIN — HEPARIN SODIUM 5000 UNIT(S): 5000 INJECTION INTRAVENOUS; SUBCUTANEOUS at 21:43

## 2018-07-09 RX ADMIN — LIDOCAINE 1 PATCH: 4 CREAM TOPICAL at 11:01

## 2018-07-09 RX ADMIN — NYSTATIN CREAM 1 APPLICATION(S): 100000 CREAM TOPICAL at 05:52

## 2018-07-09 RX ADMIN — TIOTROPIUM BROMIDE 1 CAPSULE(S): 18 CAPSULE ORAL; RESPIRATORY (INHALATION) at 11:01

## 2018-07-09 RX ADMIN — Medication 100 MILLIGRAM(S): at 11:01

## 2018-07-09 RX ADMIN — Medication 600 MILLIGRAM(S): at 17:17

## 2018-07-09 RX ADMIN — LIDOCAINE 1 PATCH: 4 CREAM TOPICAL at 20:36

## 2018-07-09 NOTE — PROGRESS NOTE ADULT - SUBJECTIVE AND OBJECTIVE BOX
[facility] - Oliva Evans, Thomas, Padmini, Shea, Jessie Velasquez  Office Number:  [phone]    Patient resting comfortably in bed in NAD.  Laying flat with no respiratory distress.  No complaints of chest pain, dyspnea, palpitations, PND, or orthopnea.    ROS: negative unless otherwise mentioned.    Telemetry:  SR    MEDICATIONS  (STANDING):  allopurinol 100 milliGRAM(s) Oral daily  buDESOnide 160 MICROgram(s)/formoterol 4.5 MICROgram(s) Inhaler 2 Puff(s) Inhalation two times a day  guaiFENesin  milliGRAM(s) Oral every 12 hours  heparin  Injectable 5000 Unit(s) SubCutaneous every 8 hours  lidocaine   Patch 1 Patch Transdermal daily  montelukast 10 milliGRAM(s) Oral daily  multivitamin 1 Tablet(s) Oral daily  nystatin Powder 1 Application(s) Topical two times a day  pantoprazole    Tablet 40 milliGRAM(s) Oral before breakfast  simvastatin 20 milliGRAM(s) Oral at bedtime  tiotropium 18 MICROgram(s) Capsule 1 Capsule(s) Inhalation daily    MEDICATIONS  (PRN):  ALBUTerol    90 MICROgram(s) HFA Inhaler 2 Puff(s) Inhalation every 6 hours PRN Shortness of Breath and/or Wheezing  HYDROcodone/homatropine Syrup 5 milliLiter(s) Oral every 6 hours PRN Cough      Allergies    Avelox (Rash)  Ceclor (Rash)  Duragesic-25 (Blisters)  Keflex (Rash)  penicillins (Anaphylaxis)  Zonegran (Rash)    Intolerances        Vital Signs Last 24 Hrs  T(C): 37.2 (09 Jul 2018 05:28), Max: 37.2 (09 Jul 2018 05:28)  T(F): 98.9 (09 Jul 2018 05:28), Max: 98.9 (09 Jul 2018 05:28)  HR: 77 (09 Jul 2018 05:28) (72 - 88)  BP: 125/80 (09 Jul 2018 05:28) (112/66 - 132/69)  BP(mean): --  RR: 18 (09 Jul 2018 05:28) (17 - 18)  SpO2: 97% (09 Jul 2018 05:28) (95% - 97%)    I&O's Summary    08 Jul 2018 07:01  -  09 Jul 2018 07:00  --------------------------------------------------------  IN: 1020 mL / OUT: 650 mL / NET: 370 mL        ON EXAM:    Constitutional: well-nourished, well-developed, NAD   HEENT:  MMM, sclerae anicteric, conjunctivae clear, no oral cyanosis.  Pulmonary: Non-labored, breath sounds are clear bilaterally, No wheezing, rales or rhonchi  Cardiovascular: Regular, S1 and S2.  No murmur.  No rubs, gallops or clicks  Gastrointestinal: Bowel Sounds present, soft, nontender.   Lymph: No peripheral edema.   Neurological: Alert, no focal deficits  Skin: No rashes.  Psych:  Mood & affect appropriate      LABS: All Labs Reviewed:                        11.6   5.56  )-----------( 208      ( 08 Jul 2018 07:15 )             34.3                         11.3   5.28  )-----------( 206      ( 07 Jul 2018 06:38 )             34.0     09 Jul 2018 06:32    139    |  102    |  23     ----------------------------<  84     4.3     |  23     |  0.83   08 Jul 2018 06:16    136    |  100    |  23     ----------------------------<  100    4.4     |  24     |  0.84   07 Jul 2018 06:10    135    |  101    |  37     ----------------------------<  102    4.8     |  20     |  1.08     Ca    9.2        09 Jul 2018 06:32  Ca    9.5        08 Jul 2018 06:16  Ca    9.4        07 Jul 2018 06:10  Phos  3.8       07 Jul 2018 06:10  Mg     1.8       07 Jul 2018 06:10    TPro  6.3    /  Alb  3.3    /  TBili  0.4    /  DBili  x      /  AST  12     /  ALT  20     /  AlkPhos  128    09 Jul 2018 06:32  TPro  6.5    /  Alb  3.3    /  TBili  0.3    /  DBili  x      /  AST  14     /  ALT  24     /  AlkPhos  125    08 Jul 2018 06:16  TPro  6.8    /  Alb  3.5    /  TBili  0.4    /  DBili  x      /  AST  18     /  ALT  30     /  AlkPhos  135    07 Jul 2018 06:10          Blood Culture:

## 2018-07-09 NOTE — PROGRESS NOTE ADULT - PROBLEM SELECTOR PLAN 1
Now on in sinus. Believed to be likely secondary to metabolic abnormalities.  K is 4.3.  - EPS and cardiology following.  - plan for EPS today. NPO after midnight   - EP note appreciated   - keep K > 4

## 2018-07-09 NOTE — DISCHARGE NOTE ADULT - MEDICATION SUMMARY - MEDICATIONS TO STOP TAKING
I will STOP taking the medications listed below when I get home from the hospital:    Avapro 150 mg oral tablet  -- 1 tab(s) by mouth 2 times a day    CeleBREX 200 mg oral capsule  -- 1 cap(s) by mouth 2 times a day    potassium chloride 20 mEq oral tablet, extended release  -- 3 tab(s) by mouth once a day

## 2018-07-09 NOTE — PROGRESS NOTE ADULT - SUBJECTIVE AND OBJECTIVE BOX
Rashid Mena MD  Division of Hospital Medicine  Pager [phone]      MENDELSOHN, ROSLYN  83y  Female      Patient is a 83y old  Female who presents with a chief complaint of transfer for PPM eval (05 Jul 2018 22:49)      INTERVAL HPI/OVERNIGHT EVENTS:  Seen at bedside. Wants to get EP study done. Offers no complaints      REVIEW OF SYSTEMS: 14 point ROS negative unless listed above    T(C): 37.4 (07-09-18 @ 11:25), Max: 37.4 (07-09-18 @ 11:25)  HR: 76 (07-09-18 @ 11:25) (76 - 88)  BP: 117/73 (07-09-18 @ 11:25) (112/66 - 125/80)  RR: 17 (07-09-18 @ 11:25) (17 - 18)  SpO2: 97% (07-09-18 @ 11:25) (97% - 97%)  Wt(kg): --Vital Signs Last 24 Hrs  T(C): 37.4 (09 Jul 2018 11:25), Max: 37.4 (09 Jul 2018 11:25)  T(F): 99.4 (09 Jul 2018 11:25), Max: 99.4 (09 Jul 2018 11:25)  HR: 76 (09 Jul 2018 11:25) (76 - 88)  BP: 117/73 (09 Jul 2018 11:25) (112/66 - 125/80)  BP(mean): --  RR: 17 (09 Jul 2018 11:25) (17 - 18)  SpO2: 97% (09 Jul 2018 11:25) (97% - 97%)    PHYSICAL EXAM:  GENERAL: NAD, well-groomed, well-developed  HEAD:  Atraumatic, Normocephalic  ENMT: No tonsillar erythema, exudates,; Moist mucous membranes. No lesions  NECK: Supple, No JVD  CHEST/LUNG: Clear to percussion bilaterally; No rales, rhonchi, wheezing, or rubs  HEART: Regular rate and rhythm; No murmurs, rubs, or gallops  ABDOMEN: Soft, Nontender, Nondistended; Bowel sounds present.  EXTREMITIES:  2+ Peripheral Pulses, No clubbing, cyanosis, or edema    Consultant(s) Notes Reviewed:  [x ] YES  [ ] NO  Care Discussed with Consultants/Other Providers [ x] YES  [ ] NO    LABS:                        11.0   4.71  )-----------( 216      ( 09 Jul 2018 07:53 )             33.8     07-09    139  |  102  |  23  ----------------------------<  84  4.3   |  23  |  0.83    Ca    9.2      09 Jul 2018 06:32    TPro  6.3  /  Alb  3.3  /  TBili  0.4  /  DBili  x   /  AST  12  /  ALT  20  /  AlkPhos  128<H>  07-09        CAPILLARY BLOOD GLUCOSE                RADIOLOGY & ADDITIONAL TESTS:    Imaging Personally Reviewed:  [x ] YES  [ ] NO

## 2018-07-09 NOTE — DISCHARGE NOTE ADULT - CARE PROVIDERS DIRECT ADDRESSES
,[email or web address]

## 2018-07-09 NOTE — DISCHARGE NOTE ADULT - PATIENT PORTAL LINK FT
You can access the [facility] Patient Portal, offered by [facility], by registering with the following website: [email or web address]

## 2018-07-09 NOTE — DISCHARGE NOTE ADULT - CARE PLAN
Principal Discharge DX:	Complete heart block, transient  Goal:	no further events  Assessment and plan of treatment:	Resolved  Follow up with PMD/cardiologist in 2 - 3 days  Secondary Diagnosis:	Hyperkalemia  Assessment and plan of treatment:	Resolved  Secondary Diagnosis:	LUCIUS (acute kidney injury)  Assessment and plan of treatment:	Avoid taking (NSAIDs) - (ex: Ibuprofen, Advil, Celebrex, Naprosyn)  Avoid taking any nephrotoxic agents (can harm kidneys) - Intravenous contrast for diagnostic testing, combination cold medications.  Have all medications adjusted for your renal function by your Health Care Provider.  Blood pressure control is important.  Take all medication as prescribed.  Secondary Diagnosis:	LBBB (left bundle branch block)  Assessment and plan of treatment:	Hidtory of LBBB  Follow up with PMD/cardiologist in 2 - 3 days  Secondary Diagnosis:	Chronic obstructive pulmonary disease, unspecified COPD type  Assessment and plan of treatment:	Call your Health Care provider upon arrival home to make a follow up appointment within one week.  Take all inhalers as prescribed by your Health Care Provider.  If your cough increases infrequency and severity and/or you have shortness of breath or increased shortness of breath call your Health Care Provider.  If you develop fever, chills, night sweats, malaise, and/or change in mental status call your Health care Provider.  Nutrition is very important.  Eat small frequent meals.  Increase your activity as tolerated.  Do not stay in bed all day Principal Discharge DX:	Complete heart block, transient  Goal:	no further events  Assessment and plan of treatment:	Resolved  Follow up with PMD/cardiologist in 2 - 3 days  Secondary Diagnosis:	Hyperkalemia  Assessment and plan of treatment:	Resolved  STOP POTASSIUM PILLS UNLESS TOLD BY PHYSICIAN  Secondary Diagnosis:	LUCIUS (acute kidney injury)  Assessment and plan of treatment:	Avoid taking (NSAIDs) - (ex: Ibuprofen, Advil, Celebrex, Naprosyn)  Avoid taking any nephrotoxic agents (can harm kidneys) - Intravenous contrast for diagnostic testing, combination cold medications.  Have all medications adjusted for your renal function by your Health Care Provider.  Blood pressure control is important.  Take all medication as prescribed.  Secondary Diagnosis:	LBBB (left bundle branch block)  Assessment and plan of treatment:	History of LBBB  Follow up with PMD/cardiologist in 2 - 3 days  Continue medications as recommended  Seek medical help for dizziness, chest pain, breathing difficulty  Secondary Diagnosis:	Chronic obstructive pulmonary disease, unspecified COPD type  Assessment and plan of treatment:	Call your Health Care provider upon arrival home to make a follow up appointment within one week.  Take all inhalers as prescribed by your Health Care Provider.  If your cough increases infrequency and severity and/or you have shortness of breath or increased shortness of breath call your Health Care Provider.  If you develop fever, chills, night sweats, malaise, and/or change in mental status call your Health care Provider.  Nutrition is very important.  Eat small frequent meals.  Increase your activity as tolerated.  Do not stay in bed all day

## 2018-07-09 NOTE — DISCHARGE NOTE ADULT - HOSPITAL COURSE
82 yo woman with PMHx HTN, COPD, LBBB, IBS, Gout, Liver Disease presented to OSH reporting malaise found to have CHB and significant hyperkalemia requiring temporary transcutaneous pacing CHB resolved after hyperkalemia management now w/persistent LBBB NSR 70-90s and overall clinical improvement   TTE poorly visualized LV, grossly mild to moderate dysfunction.    Given concomitant left axis deviation and LBBB, concern for significant underlying conduction disease EP study performed and deemed not necessary for PPM  Complete Heart Block now resolved; likely precipitated in setting of metabolic derangements (hyperkalemia); Repeat EKG NSR with LBBB  In normal sinus rhythm 60-90 on telemetry

## 2018-07-09 NOTE — DISCHARGE NOTE ADULT - MEDICATION SUMMARY - MEDICATIONS TO TAKE
I will START or STAY ON the medications listed below when I get home from the hospital:    acetaminophen 325 mg oral tablet  -- 2 tab(s) by mouth every 6 hours, As needed, Mild Pain (1 - 3)  -- Indication: For Pain    allopurinol 100 mg oral tablet  -- 1 tab(s) by mouth once a day  -- Indication: For Gout    Zocor 20 mg oral tablet  -- 1 tab(s) by mouth once a day (at bedtime)  -- Indication: For Cholesterol control    Advair Diskus 250 mcg-50 mcg inhalation powder  -- 1 puff(s) inhaled 2 times a day  -- Indication: For COPD (chronic obstructive pulmonary disease)    Proventil HFA 90 mcg/inh inhalation aerosol  -- 2 puff(s) inhaled 4 times a day, As Needed  -- Indication: For COPD (chronic obstructive pulmonary disease)    Spiriva 18 mcg inhalation capsule  -- 1 cap(s) inhaled once a day  -- Indication: For COPD (chronic obstructive pulmonary disease)    Lidoderm 5% topical film  -- Apply on skin to affected area once a day  -- Indication: For Pain    nystatin 100,000 units/g topical powder  -- 1 application on skin 2 times a day  -- Indication: For rash    Mucinex 600 mg oral tablet, extended release  -- 1 tab(s) by mouth every 12 hours  -- Indication: For Cough    Singulair 10 mg oral tablet  -- 1 tab(s) by mouth once a day (at bedtime)  -- Indication: For COPD (chronic obstructive pulmonary disease)    Astelin 137 mcg/inh nasal spray  -- 1 spray(s) into nose once a day  -- Indication: For decongesant    Flonase 50 mcg/inh nasal spray  -- 1 spray(s) into nose once a day (at bedtime)  -- Indication: For Nasal decongesant    AcipHex 20 mg oral delayed release tablet  -- 1 tab(s) by mouth once a day  -- Indication: For Heartburn - stomach protection    Multiple Vitamins oral tablet  -- 1 tab(s) by mouth once a day  -- Indication: For supplement

## 2018-07-09 NOTE — DISCHARGE NOTE ADULT - ADDITIONAL INSTRUCTIONS
Low salt low fat diet Follow up with cardiologist - Dr. Valdivia in 1 week - call for appointment  Follow up with PMD/Medicine Clinic in 1 week - call for appointment

## 2018-07-09 NOTE — DISCHARGE NOTE ADULT - PROVIDER TOKENS
TOKEN:'[id number]' TOKEN:'[id number]',FREE:[LAST:[Medicine Clinic],PHONE:[phone]],FAX:[(   )    -]]

## 2018-07-09 NOTE — DISCHARGE NOTE ADULT - SECONDARY DIAGNOSIS.
Hyperkalemia LUCIUS (acute kidney injury) LBBB (left bundle branch block) Chronic obstructive pulmonary disease, unspecified COPD type

## 2018-07-09 NOTE — DISCHARGE NOTE ADULT - CARE PROVIDER_API CALL
Paul Valdivia), Internal Medicine  [address] [place] [address]  Phone: [phone]  Fax: [phone] Paul Valdivia), Internal Medicine  [address] [place] [address]  Phone: [phone]  Fax: [phone]    [facility],   Phone: [phone]  Fax: (   )    -

## 2018-07-09 NOTE — PROGRESS NOTE ADULT - SUBJECTIVE AND OBJECTIVE BOX
Date of Admission: 7/5/18     24H hour events: No acute events overnight. Tele reviewed NSR 70-90s w/LBBB      MEDICATIONS:  heparin  Injectable 5000 Unit(s) SubCutaneous every 8 hours  ALBUTerol    90 MICROgram(s) HFA Inhaler 2 Puff(s) Inhalation every 6 hours PRN  buDESOnide 160 MICROgram(s)/formoterol 4.5 MICROgram(s) Inhaler 2 Puff(s) Inhalation two times a day  guaiFENesin  milliGRAM(s) Oral every 12 hours  HYDROcodone/homatropine Syrup 5 milliLiter(s) Oral every 6 hours PRN  montelukast 10 milliGRAM(s) Oral daily  tiotropium 18 MICROgram(s) Capsule 1 Capsule(s) Inhalation daily  pantoprazole    Tablet 40 milliGRAM(s) Oral before breakfast  allopurinol 100 milliGRAM(s) Oral daily  simvastatin 20 milliGRAM(s) Oral at bedtime  lidocaine   Patch 1 Patch Transdermal daily  multivitamin 1 Tablet(s) Oral daily  nystatin Powder 1 Application(s) Topical two times a day      REVIEW OF SYSTEMS:  Complete 10point ROS negative.    PHYSICAL EXAM:  T(C): 37.2 (07-09-18 @ 05:28), Max: 37.2 (07-09-18 @ 05:28)  HR: 77 (07-09-18 @ 05:28) (77 - 88)  BP: 125/80 (07-09-18 @ 05:28) (112/66 - 132/69)  RR: 18 (07-09-18 @ 05:28) (18 - 18)  SpO2: 97% (07-09-18 @ 05:28) (96% - 97%)  Wt(kg): --  I&O's Summary    08 Jul 2018 07:01  -  09 Jul 2018 07:00  --------------------------------------------------------  IN: 1020 mL / OUT: 650 mL / NET: 370 mL    Appearance: Normal, well appearing   HEENT:   Normal oral mucosa, PERRL, EOMI	  Lymphatic: No lymphadenopathy  Cardiovascular: Normal S1 S2, No JVD, No murmurs, No edema  Respiratory: Lungs clear to auscultation	  Psychiatry: A & O x 3, Mood & affect appropriate  Gastrointestinal:  Soft, Non-tender, + BS	  Skin: No rashes, No ecchymoses, No cyanosis	  Neurologic: Non-focal  Extremities: Normal range of motion, No clubbing, cyanosis or edema  Vascular: Peripheral pulses palpable 2+ bilaterally    LABS:	 	    CBC Full  -  ( 09 Jul 2018 07:53 )  WBC Count : 4.71 K/uL  Hemoglobin : 11.0 g/dL  Hematocrit : 33.8 %  Platelet Count - Automated : 216 K/uL  Mean Cell Volume : 89.2 fl  Mean Cell Hemoglobin : 29.0 pg  Mean Cell Hemoglobin Concentration : 32.5 gm/dL    07-09    139  |  102  |  23  ----------------------------<  84  4.3   |  23  |  0.83  07-08    136  |  100  |  23  ----------------------------<  100<H>  4.4   |  24  |  0.84    Ca    9.2      09 Jul 2018 06:32  Ca    9.5      08 Jul 2018 06:16    TPro  6.3  /  Alb  3.3  /  TBili  0.4  /  DBili  x   /  AST  12  /  ALT  20  /  AlkPhos  128<H>  07-09  TPro  6.5  /  Alb  3.3  /  TBili  0.3  /  DBili  x   /  AST  14  /  ALT  24  /  AlkPhos  125<H>  07-08      proBNP: Serum Pro-Brain Natriuretic Peptide: 396 pg/mL (07-05 @ 19:21)    TELEMETRY: 	  SR 70-90s, PAC/PVCs    PREVIOUS DIAGNOSTIC TESTING:    [ ] Echocardiogram: < from: TTE with Doppler (w/Cont) (07.06.18 @ 06:12) >  Conclusions:  Technically difficult study.  1. Mitral annular calcification, otherwise normal mitral  valve. Mild mitral regurgitation.  2. Calcified trileaflet aortic valve with normal opening.  No aortic valve regurgitation seen.  3. Endocardial visualization enhanced with intravenous  injection of echo contrast (Definity). Left ventricle  suboptimally visualized despitethe use of intravenous echo  contrast; grossly mild to moderate left ventricular  systolic function. The septum is hypokinetic. Septal motion  consistent with conduction defect.  4. Mild diastolic dysfunction (Stage I).  5. The right ventricle is notwell visualized; grossly  normal right ventricular size and systolic function.  *** No previous Echo exam.    [ ]  Catheterization: < from: Cardiac Cath Lab (07.08.10 @ 15:08) >  Ventricles: Global left ventricular function was borderline normal. EF  estimated by contrast ventriculography was 50 %.  Coronary vessels: The coronary circulation is co-dominant.  LM:      LM: Normal.  LAD:      LAD: Normal.  CX:      Circumflex: Normal.  RCA:      RCA: Normal.  Complications: There were no complications.  Recommendations:  There are no corornary lesions to account for her dyspnea or abnormal  stress test. She had signficant hypertension during the test, with the mid  LV diastolic pressure being fairlly normal. Potential other causes of her  dysnea include her COPD, inablility to exercise, and morbid obesity.  Prepared and signed by  Anoop Lambert M.D.  Signed 07/08/2010 15:49:05  	  ASSESSMENT/PLAN: 	    84 yo woman w/PMHx HTN, COPD, LBBB, Gout, Liver Disease presented to OSH reporting malaise found to have CHB and significant hyperkalemia requiring temporary transcutaneous pacing CHB resolved after hyperkalemia management now w/persistent LBBB NSR 70-90s.     #Complete Heart Block now resolved; likely precipitated in setting of metabolic derangements (hyperkalemia); Repeat EKG NSR with LBBB  - TTE poorly visualized LV, grossly mild to moderate dysfunction.    - Tele reviewed; NSR 60-90  - Given concomitant left axis deviation and LBBB, concern for significant underlying conduction disease, will plan for EPS this morning.  Will consent for EPS with possible bi-ventricular PPM     INO Yanez   [id number] Date of Admission: 7/5/18     24H hour events: No acute events overnight. Tele reviewed NSR 70-90s w/LBBB.   No complaints this morning. Eager to have her EP study and possible PPM done early this morning.      MEDICATIONS:  heparin  Injectable 5000 Unit(s) SubCutaneous every 8 hours  ALBUTerol    90 MICROgram(s) HFA Inhaler 2 Puff(s) Inhalation every 6 hours PRN  buDESOnide 160 MICROgram(s)/formoterol 4.5 MICROgram(s) Inhaler 2 Puff(s) Inhalation two times a day  guaiFENesin  milliGRAM(s) Oral every 12 hours  HYDROcodone/homatropine Syrup 5 milliLiter(s) Oral every 6 hours PRN  montelukast 10 milliGRAM(s) Oral daily  tiotropium 18 MICROgram(s) Capsule 1 Capsule(s) Inhalation daily  pantoprazole    Tablet 40 milliGRAM(s) Oral before breakfast  allopurinol 100 milliGRAM(s) Oral daily  simvastatin 20 milliGRAM(s) Oral at bedtime  lidocaine   Patch 1 Patch Transdermal daily  multivitamin 1 Tablet(s) Oral daily  nystatin Powder 1 Application(s) Topical two times a day      REVIEW OF SYSTEMS:  Complete 10point ROS negative.    PHYSICAL EXAM:  T(C): 37.2 (07-09-18 @ 05:28), Max: 37.2 (07-09-18 @ 05:28)  HR: 77 (07-09-18 @ 05:28) (77 - 88)  BP: 125/80 (07-09-18 @ 05:28) (112/66 - 132/69)  RR: 18 (07-09-18 @ 05:28) (18 - 18)  SpO2: 97% (07-09-18 @ 05:28) (96% - 97%)  Wt(kg): --  I&O's Summary    08 Jul 2018 07:01  -  09 Jul 2018 07:00  --------------------------------------------------------  IN: 1020 mL / OUT: 650 mL / NET: 370 mL    Appearance: Normal, well appearing   HEENT:   Normal oral mucosa, PERRL, EOMI	  Lymphatic: No lymphadenopathy  Cardiovascular: Normal S1 S2, No JVD, No murmurs, No edema  Respiratory: Lungs clear to auscultation	  Psychiatry: A & O x 3, Mood & affect appropriate  Gastrointestinal:  Soft, Non-tender, + BS	  Skin: No rashes, No ecchymoses, No cyanosis	  Neurologic: Non-focal  Extremities: Normal range of motion, No clubbing, cyanosis or edema  Vascular: Peripheral pulses palpable 2+ bilaterally    LABS:	 	    CBC Full  -  ( 09 Jul 2018 07:53 )  WBC Count : 4.71 K/uL  Hemoglobin : 11.0 g/dL  Hematocrit : 33.8 %  Platelet Count - Automated : 216 K/uL  Mean Cell Volume : 89.2 fl  Mean Cell Hemoglobin : 29.0 pg  Mean Cell Hemoglobin Concentration : 32.5 gm/dL    07-09    139  |  102  |  23  ----------------------------<  84  4.3   |  23  |  0.83  07-08    136  |  100  |  23  ----------------------------<  100<H>  4.4   |  24  |  0.84    Ca    9.2      09 Jul 2018 06:32  Ca    9.5      08 Jul 2018 06:16    TPro  6.3  /  Alb  3.3  /  TBili  0.4  /  DBili  x   /  AST  12  /  ALT  20  /  AlkPhos  128<H>  07-09  TPro  6.5  /  Alb  3.3  /  TBili  0.3  /  DBili  x   /  AST  14  /  ALT  24  /  AlkPhos  125<H>  07-08      proBNP: Serum Pro-Brain Natriuretic Peptide: 396 pg/mL (07-05 @ 19:21)    TELEMETRY: 	  SR 70-90s, PAC/PVCs    PREVIOUS DIAGNOSTIC TESTING:    [ ] Echocardiogram: < from: TTE with Doppler (w/Cont) (07.06.18 @ 06:12) >  Conclusions:  Technically difficult study.  1. Mitral annular calcification, otherwise normal mitral  valve. Mild mitral regurgitation.  2. Calcified trileaflet aortic valve with normal opening.  No aortic valve regurgitation seen.  3. Endocardial visualization enhanced with intravenous  injection of echo contrast (Definity). Left ventricle  suboptimally visualized despitethe use of intravenous echo  contrast; grossly mild to moderate left ventricular  systolic function. The septum is hypokinetic. Septal motion  consistent with conduction defect.  4. Mild diastolic dysfunction (Stage I).  5. The right ventricle is notwell visualized; grossly  normal right ventricular size and systolic function.  *** No previous Echo exam.    [ ]  Catheterization: < from: Cardiac Cath Lab (07.08.10 @ 15:08) >  Ventricles: Global left ventricular function was borderline normal. EF  estimated by contrast ventriculography was 50 %.  Coronary vessels: The coronary circulation is co-dominant.  LM:      LM: Normal.  LAD:      LAD: Normal.  CX:      Circumflex: Normal.  RCA:      RCA: Normal.  Complications: There were no complications.  Recommendations:  There are no corornary lesions to account for her dyspnea or abnormal  stress test. She had signficant hypertension during the test, with the mid  LV diastolic pressure being fairlly normal. Potential other causes of her  dysnea include her COPD, inablility to exercise, and morbid obesity.  Prepared and signed by  Anoop Lambert M.D.  Signed 07/08/2010 15:49:05  	  ASSESSMENT/PLAN: 	    84 yo woman w/PMHx HTN, COPD, LBBB, Gout, Liver Disease presented to OSH reporting malaise found to have CHB and significant hyperkalemia requiring temporary transcutaneous pacing CHB resolved after hyperkalemia management now w/persistent LBBB NSR 70-90s.     #Complete Heart Block now resolved; likely precipitated in setting of metabolic derangements (hyperkalemia); Repeat EKG NSR with LBBB  - TTE poorly visualized LV, grossly mild to moderate dysfunction.    - Tele reviewed; NSR 60-90  - Given concomitant left axis deviation and LBBB, concern for significant underlying conduction disease, will plan for EPS this morning.  W  - Consented for EPS with possible bi-ventricular PPM; Risks/alternatives/benefits explained to the patient, patient's  and daughter at bedside. All in agreement with this plan of care.     EP to follow     INO Yanez   [id number]

## 2018-07-09 NOTE — DISCHARGE NOTE ADULT - HOME CARE AGENCY
[facility] [phone]. rn  will visit in 1-2 day.  Please reach out to them id they don't call. Rn  visit for teaching. assessment and evaluation  for home P.t and hha.

## 2018-07-09 NOTE — DISCHARGE NOTE ADULT - PLAN OF CARE
no further events Resolved  Follow up with PMD/cardiologist in 2 - 3 days Resolved Avoid taking (NSAIDs) - (ex: Ibuprofen, Advil, Celebrex, Naprosyn)  Avoid taking any nephrotoxic agents (can harm kidneys) - Intravenous contrast for diagnostic testing, combination cold medications.  Have all medications adjusted for your renal function by your Health Care Provider.  Blood pressure control is important.  Take all medication as prescribed. Hidtory of LBBB  Follow up with PMD/cardiologist in 2 - 3 days Call your Health Care provider upon arrival home to make a follow up appointment within one week.  Take all inhalers as prescribed by your Health Care Provider.  If your cough increases infrequency and severity and/or you have shortness of breath or increased shortness of breath call your Health Care Provider.  If you develop fever, chills, night sweats, malaise, and/or change in mental status call your Health care Provider.  Nutrition is very important.  Eat small frequent meals.  Increase your activity as tolerated.  Do not stay in bed all day Resolved  STOP POTASSIUM PILLS UNLESS TOLD BY PHYSICIAN History of LBBB  Follow up with PMD/cardiologist in 2 - 3 days  Continue medications as recommended  Seek medical help for dizziness, chest pain, breathing difficulty

## 2018-07-10 VITALS
SYSTOLIC BLOOD PRESSURE: 120 MMHG | DIASTOLIC BLOOD PRESSURE: 74 MMHG | HEART RATE: 83 BPM | TEMPERATURE: 98 F | OXYGEN SATURATION: 97 % | RESPIRATION RATE: 22 BRPM

## 2018-07-10 DIAGNOSIS — I50.32 CHRONIC DIASTOLIC (CONGESTIVE) HEART FAILURE: ICD-10-CM

## 2018-07-10 DIAGNOSIS — I10 ESSENTIAL (PRIMARY) HYPERTENSION: ICD-10-CM

## 2018-07-10 LAB
ANION GAP SERPL CALC-SCNC: 11 MMOL/L — SIGNIFICANT CHANGE UP (ref 5–17)
BUN SERPL-MCNC: 19 MG/DL — SIGNIFICANT CHANGE UP (ref 7–23)
CALCIUM SERPL-MCNC: 9.3 MG/DL — SIGNIFICANT CHANGE UP (ref 8.4–10.5)
CHLORIDE SERPL-SCNC: 101 MMOL/L — SIGNIFICANT CHANGE UP (ref 96–108)
CO2 SERPL-SCNC: 23 MMOL/L — SIGNIFICANT CHANGE UP (ref 22–31)
CREAT SERPL-MCNC: 0.71 MG/DL — SIGNIFICANT CHANGE UP (ref 0.5–1.3)
GLUCOSE SERPL-MCNC: 107 MG/DL — HIGH (ref 70–99)
HCT VFR BLD CALC: 35.7 % — SIGNIFICANT CHANGE UP (ref 34.5–45)
HGB BLD-MCNC: 11.5 G/DL — SIGNIFICANT CHANGE UP (ref 11.5–15.5)
MCHC RBC-ENTMCNC: 28.9 PG — SIGNIFICANT CHANGE UP (ref 27–34)
MCHC RBC-ENTMCNC: 32.2 GM/DL — SIGNIFICANT CHANGE UP (ref 32–36)
MCV RBC AUTO: 89.7 FL — SIGNIFICANT CHANGE UP (ref 80–100)
PLATELET # BLD AUTO: 199 K/UL — SIGNIFICANT CHANGE UP (ref 150–400)
POTASSIUM SERPL-MCNC: 4.2 MMOL/L — SIGNIFICANT CHANGE UP (ref 3.5–5.3)
POTASSIUM SERPL-SCNC: 4.2 MMOL/L — SIGNIFICANT CHANGE UP (ref 3.5–5.3)
RBC # BLD: 3.98 M/UL — SIGNIFICANT CHANGE UP (ref 3.8–5.2)
RBC # FLD: 15.6 % — HIGH (ref 10.3–14.5)
SODIUM SERPL-SCNC: 135 MMOL/L — SIGNIFICANT CHANGE UP (ref 135–145)
WBC # BLD: 5.53 K/UL — SIGNIFICANT CHANGE UP (ref 3.8–10.5)
WBC # FLD AUTO: 5.53 K/UL — SIGNIFICANT CHANGE UP (ref 3.8–10.5)

## 2018-07-10 PROCEDURE — 83735 ASSAY OF MAGNESIUM: CPT

## 2018-07-10 PROCEDURE — 99232 SBSQ HOSP IP/OBS MODERATE 35: CPT

## 2018-07-10 PROCEDURE — 80061 LIPID PANEL: CPT

## 2018-07-10 PROCEDURE — 82553 CREATINE MB FRACTION: CPT

## 2018-07-10 PROCEDURE — 82550 ASSAY OF CK (CPK): CPT

## 2018-07-10 PROCEDURE — 80053 COMPREHEN METABOLIC PANEL: CPT

## 2018-07-10 PROCEDURE — 93005 ELECTROCARDIOGRAM TRACING: CPT

## 2018-07-10 PROCEDURE — 82962 GLUCOSE BLOOD TEST: CPT

## 2018-07-10 PROCEDURE — C1894: CPT

## 2018-07-10 PROCEDURE — 97161 PT EVAL LOW COMPLEX 20 MIN: CPT

## 2018-07-10 PROCEDURE — 97116 GAIT TRAINING THERAPY: CPT

## 2018-07-10 PROCEDURE — 84484 ASSAY OF TROPONIN QUANT: CPT

## 2018-07-10 PROCEDURE — 86901 BLOOD TYPING SEROLOGIC RH(D): CPT

## 2018-07-10 PROCEDURE — 85027 COMPLETE CBC AUTOMATED: CPT

## 2018-07-10 PROCEDURE — C8929: CPT

## 2018-07-10 PROCEDURE — 71045 X-RAY EXAM CHEST 1 VIEW: CPT

## 2018-07-10 PROCEDURE — 97530 THERAPEUTIC ACTIVITIES: CPT

## 2018-07-10 PROCEDURE — C1730: CPT

## 2018-07-10 PROCEDURE — 84443 ASSAY THYROID STIM HORMONE: CPT

## 2018-07-10 PROCEDURE — 84100 ASSAY OF PHOSPHORUS: CPT

## 2018-07-10 PROCEDURE — 80048 BASIC METABOLIC PNL TOTAL CA: CPT

## 2018-07-10 PROCEDURE — 83036 HEMOGLOBIN GLYCOSYLATED A1C: CPT

## 2018-07-10 PROCEDURE — 86850 RBC ANTIBODY SCREEN: CPT

## 2018-07-10 PROCEDURE — 99239 HOSP IP/OBS DSCHRG MGMT >30: CPT

## 2018-07-10 PROCEDURE — 94640 AIRWAY INHALATION TREATMENT: CPT

## 2018-07-10 PROCEDURE — 93619 COMPREHENSIVE EP EVALUATION: CPT

## 2018-07-10 PROCEDURE — 86900 BLOOD TYPING SEROLOGIC ABO: CPT

## 2018-07-10 RX ORDER — NYSTATIN CREAM 100000 [USP'U]/G
1 CREAM TOPICAL
Qty: 0 | Refills: 0 | COMMUNITY
Start: 2018-07-10

## 2018-07-10 RX ADMIN — HEPARIN SODIUM 5000 UNIT(S): 5000 INJECTION INTRAVENOUS; SUBCUTANEOUS at 06:25

## 2018-07-10 RX ADMIN — Medication 600 MILLIGRAM(S): at 06:24

## 2018-07-10 RX ADMIN — LIDOCAINE 1 PATCH: 4 CREAM TOPICAL at 08:58

## 2018-07-10 RX ADMIN — BUDESONIDE AND FORMOTEROL FUMARATE DIHYDRATE 2 PUFF(S): 160; 4.5 AEROSOL RESPIRATORY (INHALATION) at 11:05

## 2018-07-10 RX ADMIN — PANTOPRAZOLE SODIUM 40 MILLIGRAM(S): 20 TABLET, DELAYED RELEASE ORAL at 06:25

## 2018-07-10 RX ADMIN — Medication 1 TABLET(S): at 12:59

## 2018-07-10 RX ADMIN — MONTELUKAST 10 MILLIGRAM(S): 4 TABLET, CHEWABLE ORAL at 12:59

## 2018-07-10 RX ADMIN — TIOTROPIUM BROMIDE 1 CAPSULE(S): 18 CAPSULE ORAL; RESPIRATORY (INHALATION) at 12:59

## 2018-07-10 RX ADMIN — Medication 100 MILLIGRAM(S): at 12:59

## 2018-07-10 RX ADMIN — NYSTATIN CREAM 1 APPLICATION(S): 100000 CREAM TOPICAL at 06:25

## 2018-07-10 NOTE — PROGRESS NOTE ADULT - ASSESSMENT
82 Y/O/F with PMHx of  HTN, COPD , LBBB, IBS, and gout presented with N/V, and encephalopathy/hyperkalemia and complete heart block which resolved with transcutaneous pacing and insulin/dextrose, now in NSR and overall clinical improvement
82 yo female pmhx HTN, COPD, LBBB, HFPEF, Gout p/w CHB in setting of Edilberto and Hyperkalemia.     - K is now normal  - no recurrent heart block  - given the event and evidence for significant conduction disease, ep is planning eps monday.  - keep npo p mn sunday   - Appears clinically compensated from a HF POV.   - Cont to hold Avapro  - bp labile  - Please continue to maintain strict I/Os, monitor daily weights, Cr, and K.   - no acute ischemia  - trop neg and cath in 2010 with nonobs disease.   - Further cardiac workup will depend on clinical course.   - Monitor and replete electrolytes. Keep K>4.0 and Mg>2.0.    - will follow
84 yo female pmhx HTN, COPD (emphysematous type), LBBB, IBS, and gout p/w n/v, and encephalopathy in the setting of hyperkalemia (K of 7) and CHB which resolved with transcutaneous pacing and insulin/dextrose, now in NSR and overall clinical improvement    # Neuro - no hx of CVA, AOx4  - no active issues    # Cardiac    1) CHB - resolved after transcutaneous pacing and insulin/dextrose/kayexalate x2/duoneb for hyperkalemia, more likely 2/2 electrolyte abnormality   - monitor BMP BID; K > 4; Mg > 2  - monitor on telemetry  - f/u EP recs; unlikely to need TVP or PPM now that CHB has resolved but will place TVP if pt returns to CHB   - treat Hyperkalemia  - check TTE  - NPO for possible PPM - will establish necessity in the AM    2) LBBB - chronic, stable  - monitor on telemetry  - K > 4, Mg > 2    # Pulmonary    1) COPD  - c/w O2 NC  - c/w duonebs PRN  - monitor O2 sats; goal sat 88-92 to maintain respiratory drive    GI - hx of IBS w/ diarrhea and constipation, currently not having issues  - DASH-TLC diet  - will monitor BMs now that pt has received kayexalate and has a hx of IBS w/ diarrhea; will monitor BMP for K    Renal/Electrolytes/Metabolic - no hx of renal disease  1) Hyperkalemia - K now 6.6 from 7 s/p insulin and dextrose; Kayexalate and 1 amp of bicarb given  - trend BMP Q8 for Cr and electrolytes  - K > 4, Mg > 2    2) LUCIUS - Cr initially at 1.5, now at 1.32 likely pre-renal azotemia in the setting of poor PO intake and insensible losses c/b CHB and bradycardia  - trend BMP Q8 for Cr and electrolytes  - encourage PO intake  - renally dose all meds, avoid nephrotoxins, strict I&O, daily weights, avoid RCA    # Endocrine - no hx of DM or thyroid disease  - f/u A1c, TSH, and lipid profile    # Heme - no hx of hematologic disease, not anemic/thrombocytopenic  - trend CBC  - maintain active T&S; Hgb > 8     # Infectious Disease - not currently infected  - no active issues    # Ethics  - full code
82 Y/O/F with PMHx of  HTN, COPD , LBBB, IBS, and gout presented with N/V, and encephalopathy/hyperkalemia and complete heart block  which resolved with transcutaneous pacing and insulin/dextrose, now in NSR and overall clinical improvement
82 yo female pmhx HTN, COPD, LBBB, HFPEF, Gout p/w CHB in setting of Edilberto and Hyperkalemia.     - K remains normal  - no recurrent heart block  - given the event and evidence for significant conduction disease, ep is planning eps tomorrow.  - keep npo p mn tonight  - Appears clinically compensated from a HF POV.   - Cont to hold Avapro  - bp normal past 24h  - Please continue to maintain strict I/Os, monitor daily weights, Cr, and K.   - no acute ischemia  - trop neg and cath in 2010 with nonobs disease.   - Further cardiac workup will depend on clinical course.   - Monitor and replete electrolytes. Keep K>4.0 and Mg>2.0.    - will follow
82 yo female pmhx HTN, COPD, LBBB, HFPEF, Gout p/w CHB in setting of LUCIUS and Hyperkalemia.     - K remains normal  - no recurrent heart block  - given the event and evidence for significant conduction disease, she is now s/p EP study which was unremarkable  - Appears clinically compensated from a HF POV, except for some mild LE edema.   - Cont to hold Avapro  - bp normal past 48 hours  - Her Lasix may need to be restarted given her LE edema  - Stop potassium supplementation, even if she goes back on low dose diuretic  - Please continue to maintain strict I/Os, monitor daily weights, Cr, and K.   - no acute ischemia  - trop neg and cath in 2010 with nonobstructive disease.   - Further cardiac workup will depend on clinical course.   - Monitor and replete electrolytes. Keep K>4.0 and Mg>2.0.    - PT evalution; she reports being wobbly on her feet  - will follow
82 yo woman w/PMHx HTN, COPD, LBBB, Gout, Liver Disease presented to OSH reporting malaise found to have CHB and significant hyperkalemia requiring temporary transcutaneous pacing CHB resolved after hyperkalemia management now w/persistent LBBB NSR 60-90s.      #Complete Heart Block now resolved; likely precipitated in setting of metabolic derangements (hyperkalemia)  - f/u TTE   - Repeat EKG NSR with LBBB  - Tele reviewed; NSR 60-90  - Given concomitant left axis deviation and LBBB, concern for significant underlying conduction disease, will plan for EPS Monday 7/9, please make NPO at midnight Sunday.     Brian Harrell  Cardiology Fellow
84 Y/O F with PMHx of  HTN,  , LBBB, IBS, and gout presented with N/V, and encephalopathy/hyperkalemia and complete heart block which resolved with transcutaneous pacing and insulin/dextrose, now in NSR s/p EP study
84 yo female pmhx HTN, COPD, LBBB, HFPEF, Gout p/w CHB in setting of Edilberto and Hyperkalemia.     - K remains normal  - no recurrent heart block  - given the event and evidence for significant conduction disease, ep is planning an EPS today  - keep npo  - Appears clinically compensated from a HF POV, except for some mild LE edema.   - Cont to hold Avapro  - bp normal past 24h  - Please continue to maintain strict I/Os, monitor daily weights, Cr, and K.   - no acute ischemia  - trop neg and cath in 2010 with nonobstructive disease.   - Further cardiac workup will depend on clinical course.   - Monitor and replete electrolytes. Keep K>4.0 and Mg>2.0.    - will follow
82 Y/O/F with PMHx of  HTN, COPD , LBBB, IBS, and gout presented with N/V, and encephalopathy/hyperkalemia and complete heart block  which resolved with transcutaneous pacing and insulin/dextrose, now in NSR and overall clinical improvement

## 2018-07-10 NOTE — PROGRESS NOTE ADULT - PROBLEM SELECTOR PROBLEM 5
Gout
Gout
Irritable bowel syndrome with both constipation and diarrhea
Irritable bowel syndrome with both constipation and diarrhea

## 2018-07-10 NOTE — PROGRESS NOTE ADULT - PROBLEM SELECTOR PROBLEM 4
Irritable bowel syndrome with both constipation and diarrhea
COPD (chronic obstructive pulmonary disease)
Irritable bowel syndrome with both constipation and diarrhea
LUCIUS (acute kidney injury)

## 2018-07-10 NOTE — PROGRESS NOTE ADULT - PROBLEM SELECTOR PROBLEM 3
COPD (chronic obstructive pulmonary disease)
COPD (chronic obstructive pulmonary disease)
Diastolic CHF, chronic
COPD (chronic obstructive pulmonary disease)

## 2018-07-10 NOTE — PROGRESS NOTE ADULT - PROBLEM SELECTOR PLAN 8
HSQ for DVT ppx  PT recommending MECCA. If pt returns to home, pt will require assist with all functional mobility    Plan for discharge home today as pt refusing MECCA placement. To follow up outpatient with Cardiology and PCP   Discharge time spent 34 minutes

## 2018-07-10 NOTE — PROGRESS NOTE ADULT - SUBJECTIVE AND OBJECTIVE BOX
Date of Admission: 7/5/18     24H hour events: No acute events. EPS conduction study done 7/9 without significant conduction disease.     MEDICATIONS:  heparin  Injectable 5000 Unit(s) SubCutaneous every 8 hours  ALBUTerol    90 MICROgram(s) HFA Inhaler 2 Puff(s) Inhalation every 6 hours PRN  buDESOnide 160 MICROgram(s)/formoterol 4.5 MICROgram(s) Inhaler 2 Puff(s) Inhalation two times a day  guaiFENesin  milliGRAM(s) Oral every 12 hours  HYDROcodone/homatropine Syrup 5 milliLiter(s) Oral every 6 hours PRN  montelukast 10 milliGRAM(s) Oral daily  tiotropium 18 MICROgram(s) Capsule 1 Capsule(s) Inhalation daily  pantoprazole    Tablet 40 milliGRAM(s) Oral before breakfast  allopurinol 100 milliGRAM(s) Oral daily  simvastatin 20 milliGRAM(s) Oral at bedtime    lidocaine   Patch 1 Patch Transdermal daily  multivitamin 1 Tablet(s) Oral daily  nystatin Powder 1 Application(s) Topical two times a day      REVIEW OF SYSTEMS:  Complete 10point ROS negative.    PHYSICAL EXAM:  T(C): 36.7 (07-10-18 @ 04:26), Max: 37.4 (07-09-18 @ 11:25)  HR: 74 (07-10-18 @ 04:26) (68 - 90)  BP: 125/83 (07-10-18 @ 04:26) (102/64 - 141/71)  RR: 18 (07-10-18 @ 04:26) (17 - 18)  SpO2: 95% (07-10-18 @ 04:26) (95% - 98%)  Wt(kg): --  I&O's Summary    09 Jul 2018 07:01  -  10 Jul 2018 07:00  --------------------------------------------------------  IN: 460 mL / OUT: 200 mL / NET: 260 mL        Appearance: Normal	  HEENT:   Normal oral mucosa, PERRL, EOMI	  Lymphatic: No lymphadenopathy  Cardiovascular: Normal S1 S2, No JVD, No murmurs, No edema  Respiratory: Lungs clear to auscultation	  Psychiatry: A & O x 3, Mood & affect appropriate  Gastrointestinal:  Soft, Non-tender, + BS	  Skin: No rashes, No ecchymoses, No cyanosis	  Neurologic: Non-focal  Extremities: Normal range of motion, No clubbing, cyanosis or edema  Vascular: Peripheral pulses palpable 2+ bilaterally        LABS:	 	    CBC Full  -  ( 10 Jul 2018 08:02 )  WBC Count : 5.53 K/uL  Hemoglobin : 11.5 g/dL  Hematocrit : 35.7 %  Platelet Count - Automated : 199 K/uL  Mean Cell Volume : 89.7 fl  Mean Cell Hemoglobin : 28.9 pg  Mean Cell Hemoglobin Concentration : 32.2 gm/dL    07-10    135  |  101  |  19  ----------------------------<  107<H>  4.2   |  23  |  0.71  07-09    139  |  102  |  23  ----------------------------<  84  4.3   |  23  |  0.83    Ca    9.3      10 Jul 2018 06:47  Ca    9.2      09 Jul 2018 06:32    TPro  6.3  /  Alb  3.3  /  TBili  0.4  /  DBili  x   /  AST  12  /  ALT  20  /  AlkPhos  128<H>  07-09      proBNP: Serum Pro-Brain Natriuretic Peptide: 396 pg/mL (07-05 @ 19:21)    TELEMETRY: 	  SR , PACs, PVCs     	  ASSESSMENT/PLAN: 	    82 yo woman w/PMHx HTN, COPD, LBBB, Gout, Liver Disease presented to OSH reporting malaise found to have CHB and significant hyperkalemia requiring temporary transcutaneous pacing CHB resolved after hyperkalemia management now w/persistent LBBB NSR 70-100s    #Complete Heart Block now resolved; likely precipitated in setting of metabolic derangements (hyperkalemia); Repeat EKG NSR with LBBB  - TTE poorly visualized LV, grossly mild to moderate dysfunction.    - Tele reviewed; NSR 70-100s  - Given concomitant left axis deviation and LBBB, EPS study done on 7/9  No evidence of significant underlying conduction disease to warrant PPM placement.     EP to sign off at this time.     J Delphin   [id number] Date of Admission: 7/5/18     24H hour events: No acute events. EPS conduction study done 7/9 without significant conduction disease. This am, she reports feeling well without any complaints. Eager to work with physical therapy to see if she can go home today vs. rehab.     MEDICATIONS:  heparin  Injectable 5000 Unit(s) SubCutaneous every 8 hours  ALBUTerol    90 MICROgram(s) HFA Inhaler 2 Puff(s) Inhalation every 6 hours PRN  buDESOnide 160 MICROgram(s)/formoterol 4.5 MICROgram(s) Inhaler 2 Puff(s) Inhalation two times a day  guaiFENesin  milliGRAM(s) Oral every 12 hours  HYDROcodone/homatropine Syrup 5 milliLiter(s) Oral every 6 hours PRN  montelukast 10 milliGRAM(s) Oral daily  tiotropium 18 MICROgram(s) Capsule 1 Capsule(s) Inhalation daily  pantoprazole    Tablet 40 milliGRAM(s) Oral before breakfast  allopurinol 100 milliGRAM(s) Oral daily  simvastatin 20 milliGRAM(s) Oral at bedtime    lidocaine   Patch 1 Patch Transdermal daily  multivitamin 1 Tablet(s) Oral daily  nystatin Powder 1 Application(s) Topical two times a day      REVIEW OF SYSTEMS:  Complete 10point ROS negative.    PHYSICAL EXAM:  T(C): 36.7 (07-10-18 @ 04:26), Max: 37.4 (07-09-18 @ 11:25)  HR: 74 (07-10-18 @ 04:26) (68 - 90)  BP: 125/83 (07-10-18 @ 04:26) (102/64 - 141/71)  RR: 18 (07-10-18 @ 04:26) (17 - 18)  SpO2: 95% (07-10-18 @ 04:26) (95% - 98%)  Wt(kg): --  I&O's Summary    09 Jul 2018 07:01  -  10 Jul 2018 07:00  --------------------------------------------------------  IN: 460 mL / OUT: 200 mL / NET: 260 mL        Appearance: Normal	  HEENT:   Normal oral mucosa, PERRL, EOMI	  Lymphatic: No lymphadenopathy  Cardiovascular: Normal S1 S2, No JVD, No murmurs, No edema  Respiratory: Lungs clear to auscultation	  Psychiatry: A & O x 3, Mood & affect appropriate  Gastrointestinal:  Soft, Non-tender, + BS	  Skin: No rashes, No ecchymoses, No cyanosis	  Neurologic: Non-focal  Extremities: Normal range of motion, No clubbing, cyanosis or edema; R Groin access site, soft, nontender, no hematoma  Vascular: Peripheral pulses palpable 2+ bilaterally      LABS:	 	    CBC Full  -  ( 10 Jul 2018 08:02 )  WBC Count : 5.53 K/uL  Hemoglobin : 11.5 g/dL  Hematocrit : 35.7 %  Platelet Count - Automated : 199 K/uL  Mean Cell Volume : 89.7 fl  Mean Cell Hemoglobin : 28.9 pg  Mean Cell Hemoglobin Concentration : 32.2 gm/dL    07-10    135  |  101  |  19  ----------------------------<  107<H>  4.2   |  23  |  0.71  07-09    139  |  102  |  23  ----------------------------<  84  4.3   |  23  |  0.83    Ca    9.3      10 Jul 2018 06:47  Ca    9.2      09 Jul 2018 06:32    TPro  6.3  /  Alb  3.3  /  TBili  0.4  /  DBili  x   /  AST  12  /  ALT  20  /  AlkPhos  128<H>  07-09      proBNP: Serum Pro-Brain Natriuretic Peptide: 396 pg/mL (07-05 @ 19:21)    TELEMETRY: 	  SR , PACs, PVCs     	  ASSESSMENT/PLAN: 	    82 yo woman w/PMHx HTN, COPD, LBBB, Gout, Liver Disease presented to OSH reporting malaise found to have CHB and significant hyperkalemia requiring temporary transcutaneous pacing CHB resolved after hyperkalemia management now w/persistent LBBB NSR 70-100s    #Complete Heart Block now resolved; likely precipitated in setting of metabolic derangements (hyperkalemia); Repeat EKG NSR with LBBB  - TTE poorly visualized LV, grossly mild to moderate dysfunction.    - Tele reviewed; NSR 70-100s  - Given concomitant left axis deviation and LBBB, EPS study done on 7/9  No evidence of significant underlying conduction disease to warrant PPM placement.     EP to sign off at this time.     J Delphin   [id number]

## 2018-07-10 NOTE — PROGRESS NOTE ADULT - PROVIDER SPECIALTY LIST ADULT
Cardiology
Electrophysiology
Hospitalist
Hospitalist
Internal Medicine
Electrophysiology
CCU
Cardiology
Internal Medicine

## 2018-07-10 NOTE — PROGRESS NOTE ADULT - PROBLEM SELECTOR PLAN 2
old   stable  no chest pain
old   stable  no chest pain
old, stable  no chest pain  Cardiology following
old   continue to monitor on tele  no chest pain

## 2018-07-10 NOTE — PROGRESS NOTE ADULT - PROBLEM SELECTOR PLAN 3
No signs of bronchospasm on examination  continue on budesonide inhaler  cont Singulair  cont Guaifenesin  hycodan as needed for cough
Grade 1 diastolic disfunction on Echo. Appears Euvolemic
no wheezing on exam  continue on budesonide inhaler  cont Singulair  hycodan as needed for cough
no wheezing on exam  continue on budesonide inhaler  cont Singulair  hycodan as needed for cough

## 2018-07-10 NOTE — PROGRESS NOTE ADULT - PROBLEM SELECTOR PLAN 4
Having BMS.
No signs of bronchospasm on examination  continue on budesonide inhaler  cont Singulair  cont Guaifenesin  hycodan as needed for cough
no diarrhea
resolved. creatinine is 1.08  CO2 is 20 likely dilutional

## 2018-07-10 NOTE — PROGRESS NOTE ADULT - SUBJECTIVE AND OBJECTIVE BOX
Rashid Mena MD  Division of Hospital Medicine  Pager [phone]      MENDELSOHN, ROSLYN  83y  Female      Patient is a 83y old  Female who presents with a chief complaint of transfer for PPM eval (09 Jul 2018 16:40)      INTERVAL HPI/OVERNIGHT EVENTS:  Seen sitting in chair, wants to go home. Tolerated EP study well yesterday. No complaints.       REVIEW OF SYSTEMS: 14 point ROS negative unless listed above    T(C): 36.8 (07-10-18 @ 11:24), Max: 37.1 (07-09-18 @ 16:00)  HR: 83 (07-10-18 @ 11:24) (68 - 90)  BP: 120/74 (07-10-18 @ 11:24) (102/64 - 141/71)  RR: 22 (07-10-18 @ 11:24) (17 - 22)  SpO2: 97% (07-10-18 @ 11:24) (95% - 98%)  Wt(kg): --Vital Signs Last 24 Hrs  T(C): 36.8 (10 Jul 2018 11:24), Max: 37.1 (09 Jul 2018 16:00)  T(F): 98.2 (10 Jul 2018 11:24), Max: 98.7 (09 Jul 2018 16:00)  HR: 83 (10 Jul 2018 11:24) (68 - 90)  BP: 120/74 (10 Jul 2018 11:24) (102/64 - 141/71)  BP(mean): --  RR: 22 (10 Jul 2018 11:24) (17 - 22)  SpO2: 97% (10 Jul 2018 11:24) (95% - 98%)    PHYSICAL EXAM:  GENERAL: NAD, well-groomed, well-developed  HEAD:  Atraumatic, Normocephalic  ENMT: No tonsillar erythema, exudates,; Moist mucous membranes. No lesions  NECK: Supple, No JVD  CHEST/LUNG: Clear to percussion bilaterally; No rales, rhonchi, wheezing, or rubs  HEART: Regular rate and rhythm; No murmurs, rubs, or gallops  ABDOMEN: Soft, Nontender, Nondistended; Bowel sounds present.  EXTREMITIES:  2+ Peripheral Pulses, No clubbing, cyanosis, or edema    Consultant(s) Notes Reviewed:  [x ] YES  [ ] NO  Care Discussed with Consultants/Other Providers [ x] YES  [ ] NO    LABS:                        11.5   5.53  )-----------( 199      ( 10 Jul 2018 08:02 )             35.7     07-10    135  |  101  |  19  ----------------------------<  107<H>  4.2   |  23  |  0.71    Ca    9.3      10 Jul 2018 06:47    TPro  6.3  /  Alb  3.3  /  TBili  0.4  /  DBili  x   /  AST  12  /  ALT  20  /  AlkPhos  128<H>  07-09        CAPILLARY BLOOD GLUCOSE                RADIOLOGY & ADDITIONAL TESTS:    Imaging Personally Reviewed:  [x ] YES  [ ] NO

## 2018-07-10 NOTE — PROGRESS NOTE ADULT - PROBLEM SELECTOR PLAN 6
h/o Gout stable
HSQ for DVT ppx  PT recommending MECCA. If pt returns to home, pt will require assist with all functional mobility
BP at goal, off medications.   - Hold Avapro on discharge given hyperkalemia

## 2018-07-10 NOTE — PROGRESS NOTE ADULT - SUBJECTIVE AND OBJECTIVE BOX
[facility] - Oliva Evans, Thomas, Padmini, Shea, Jessie Velasquez  Office Number:  [phone]    Patient resting comfortably in bed in NAD.  Laying flat with no respiratory distress.  No complaints of chest pain, dyspnea, palpitations, PND, or orthopnea.  Reports some lower extremity edema.    ROS: negative unless otherwise mentioned.    Telemetry:  SR     MEDICATIONS  (STANDING):  allopurinol 100 milliGRAM(s) Oral daily  buDESOnide 160 MICROgram(s)/formoterol 4.5 MICROgram(s) Inhaler 2 Puff(s) Inhalation two times a day  guaiFENesin  milliGRAM(s) Oral every 12 hours  heparin  Injectable 5000 Unit(s) SubCutaneous every 8 hours  lidocaine   Patch 1 Patch Transdermal daily  montelukast 10 milliGRAM(s) Oral daily  multivitamin 1 Tablet(s) Oral daily  nystatin Powder 1 Application(s) Topical two times a day  pantoprazole    Tablet 40 milliGRAM(s) Oral before breakfast  simvastatin 20 milliGRAM(s) Oral at bedtime  tiotropium 18 MICROgram(s) Capsule 1 Capsule(s) Inhalation daily    MEDICATIONS  (PRN):  ALBUTerol    90 MICROgram(s) HFA Inhaler 2 Puff(s) Inhalation every 6 hours PRN Shortness of Breath and/or Wheezing  HYDROcodone/homatropine Syrup 5 milliLiter(s) Oral every 6 hours PRN Cough      Allergies    Avelox (Rash)  Ceclor (Rash)  Duragesic-25 (Blisters)  Keflex (Rash)  penicillins (Anaphylaxis)  Zonegran (Rash)    Intolerances        Vital Signs Last 24 Hrs  T(C): 36.7 (10 Jul 2018 04:26), Max: 37.4 (09 Jul 2018 11:25)  T(F): 98.1 (10 Jul 2018 04:26), Max: 99.4 (09 Jul 2018 11:25)  HR: 74 (10 Jul 2018 04:26) (68 - 90)  BP: 125/83 (10 Jul 2018 04:26) (102/64 - 141/71)  BP(mean): --  RR: 18 (10 Jul 2018 04:26) (17 - 18)  SpO2: 95% (10 Jul 2018 04:26) (95% - 98%)    I&O's Summary    09 Jul 2018 07:01  -  10 Jul 2018 07:00  --------------------------------------------------------  IN: 460 mL / OUT: 200 mL / NET: 260 mL        ON EXAM:    Constitutional: well-nourished, well-developed, NAD   HEENT:  MMM, sclerae anicteric, conjunctivae clear, no oral cyanosis.  Pulmonary: Non-labored, breath sounds are clear bilaterally, No wheezing, rales or rhonchi  Cardiovascular: Regular, S1 and S2.  No murmur.  No rubs, gallops or clicks  Gastrointestinal: Bowel Sounds present, soft, nontender.   Lymph: No peripheral edema.   Neurological: Alert, no focal deficits  Skin: No rashes; no pain or ecchymosis at right groin site.  Psych:  Mood & affect appropriate    LABS: All Labs Reviewed:                        11.5   5.53  )-----------( 199      ( 10 Jul 2018 08:02 )             35.7                         11.0   4.71  )-----------( 216      ( 09 Jul 2018 07:53 )             33.8                         11.6   5.56  )-----------( 208      ( 08 Jul 2018 07:15 )             34.3     10 Jul 2018 06:47    135    |  101    |  19     ----------------------------<  107    4.2     |  23     |  0.71   09 Jul 2018 06:32    139    |  102    |  23     ----------------------------<  84     4.3     |  23     |  0.83   08 Jul 2018 06:16    136    |  100    |  23     ----------------------------<  100    4.4     |  24     |  0.84     Ca    9.3        10 Jul 2018 06:47  Ca    9.2        09 Jul 2018 06:32  Ca    9.5        08 Jul 2018 06:16    TPro  6.3    /  Alb  3.3    /  TBili  0.4    /  DBili  x      /  AST  12     /  ALT  20     /  AlkPhos  128    09 Jul 2018 06:32  TPro  6.5    /  Alb  3.3    /  TBili  0.3    /  DBili  x      /  AST  14     /  ALT  24     /  AlkPhos  125    08 Jul 2018 06:16          Blood Culture:

## 2018-07-10 NOTE — PROGRESS NOTE ADULT - PROBLEM SELECTOR PLAN 1
Resolved. Now on in sinus. Believed to be likely secondary to metabolic abnormalities.  Potassium normal.  - EPS and cardiology following. s/p EPS yesterday. No indication for ppm currently  - EP note appreciated   - keep K > 4

## 2018-07-18 ENCOUNTER — APPOINTMENT (OUTPATIENT)
Dept: CARDIOLOGY | Facility: CLINIC | Age: 83
End: 2018-07-18
Payer: COMMERCIAL

## 2018-07-18 ENCOUNTER — NON-APPOINTMENT (OUTPATIENT)
Age: 83
End: 2018-07-18

## 2018-07-18 VITALS
OXYGEN SATURATION: 96 % | WEIGHT: 180 LBS | HEIGHT: 61 IN | HEART RATE: 122 BPM | DIASTOLIC BLOOD PRESSURE: 70 MMHG | BODY MASS INDEX: 33.99 KG/M2 | SYSTOLIC BLOOD PRESSURE: 110 MMHG

## 2018-07-18 DIAGNOSIS — E87.5 HYPERKALEMIA: ICD-10-CM

## 2018-07-18 PROCEDURE — 93000 ELECTROCARDIOGRAM COMPLETE: CPT

## 2018-07-18 PROCEDURE — 99215 OFFICE O/P EST HI 40 MIN: CPT

## 2018-07-19 LAB
ANION GAP SERPL CALC-SCNC: 18 MMOL/L
BUN SERPL-MCNC: 24 MG/DL
CALCIUM SERPL-MCNC: 10.2 MG/DL
CHLORIDE SERPL-SCNC: 98 MMOL/L
CO2 SERPL-SCNC: 24 MMOL/L
CREAT SERPL-MCNC: 0.88 MG/DL
GLUCOSE SERPL-MCNC: 85 MG/DL
MAGNESIUM SERPL-MCNC: 2 MG/DL
POTASSIUM SERPL-SCNC: 4.4 MMOL/L
SODIUM SERPL-SCNC: 139 MMOL/L

## 2018-07-30 ENCOUNTER — APPOINTMENT (OUTPATIENT)
Dept: INTERNAL MEDICINE | Facility: CLINIC | Age: 83
End: 2018-07-30

## 2018-08-10 ENCOUNTER — APPOINTMENT (OUTPATIENT)
Dept: INTERNAL MEDICINE | Facility: CLINIC | Age: 83
End: 2018-08-10
Payer: COMMERCIAL

## 2018-08-10 VITALS
RESPIRATION RATE: 14 BRPM | SYSTOLIC BLOOD PRESSURE: 128 MMHG | BODY MASS INDEX: 33.99 KG/M2 | HEIGHT: 61 IN | OXYGEN SATURATION: 95 % | TEMPERATURE: 98.2 F | WEIGHT: 180 LBS | DIASTOLIC BLOOD PRESSURE: 82 MMHG | HEART RATE: 108 BPM

## 2018-08-10 DIAGNOSIS — I45.4 NONSPECIFIC INTRAVENTRICULAR BLOCK: ICD-10-CM

## 2018-08-10 PROCEDURE — 99204 OFFICE O/P NEW MOD 45 MIN: CPT | Mod: 25

## 2018-08-10 PROCEDURE — 36415 COLL VENOUS BLD VENIPUNCTURE: CPT

## 2018-08-10 NOTE — HEALTH RISK ASSESSMENT
[] : Yes [Any fall with injury in past year] : Patient reported fall with injury in the past year [0] : 2) Feeling down, depressed, or hopeless: Not at all (0)

## 2018-08-10 NOTE — HISTORY OF PRESENT ILLNESS
[FreeTextEntry1] : Here for initial visit \par recent Surgery for Kyphoplasty compression Fracture T 8\par has HTN

## 2018-08-11 RX ORDER — FUROSEMIDE 40 MG/1
40 TABLET ORAL
Qty: 30 | Refills: 0 | Status: DISCONTINUED | COMMUNITY
Start: 2018-05-08

## 2018-08-11 RX ORDER — FUROSEMIDE 80 MG/1
80 TABLET ORAL
Qty: 90 | Refills: 0 | Status: DISCONTINUED | COMMUNITY
Start: 2018-05-14

## 2018-08-11 RX ORDER — CYCLOBENZAPRINE HYDROCHLORIDE 10 MG/1
10 TABLET, FILM COATED ORAL
Qty: 30 | Refills: 0 | Status: DISCONTINUED | COMMUNITY
Start: 2018-05-10

## 2018-08-11 RX ORDER — NITROFURANTOIN (MONOHYDRATE/MACROCRYSTALS) 25; 75 MG/1; MG/1
100 CAPSULE ORAL
Qty: 20 | Refills: 0 | Status: DISCONTINUED | COMMUNITY
Start: 2018-04-19

## 2018-08-11 RX ORDER — HYDROCODONE BITARTRATE AND HOMATROPINE METHYLBROMIDE 5; 1.5 MG/5ML; MG/5ML
5-1.5 SYRUP ORAL
Qty: 240 | Refills: 0 | Status: DISCONTINUED | COMMUNITY
Start: 2018-07-05

## 2018-08-11 RX ORDER — HYOSCYAMINE SULFATE 0.12 MG/1
0.12 TABLET ORAL
Qty: 720 | Refills: 0 | Status: DISCONTINUED | COMMUNITY
Start: 2017-11-14

## 2018-08-11 RX ORDER — SPIRONOLACTONE 100 MG/1
100 TABLET ORAL
Qty: 10 | Refills: 0 | Status: DISCONTINUED | COMMUNITY
Start: 2018-05-14

## 2018-08-11 RX ORDER — HYDROCODONE BITARTRATE AND ACETAMINOPHEN 7.5; 3 MG/1; MG/1
7.5-3 TABLET ORAL
Qty: 120 | Refills: 0 | Status: DISCONTINUED | COMMUNITY
Start: 2018-05-08

## 2018-08-13 LAB
ANION GAP SERPL CALC-SCNC: 26 MMOL/L
BASOPHILS # BLD AUTO: 0.02 K/UL
BASOPHILS NFR BLD AUTO: 0.3 %
BUN SERPL-MCNC: 27 MG/DL
CALCIUM SERPL-MCNC: 9.9 MG/DL
CHLORIDE SERPL-SCNC: 98 MMOL/L
CO2 SERPL-SCNC: 24 MMOL/L
CREAT SERPL-MCNC: 0.93 MG/DL
EOSINOPHIL # BLD AUTO: 0.08 K/UL
EOSINOPHIL NFR BLD AUTO: 1.3 %
GLUCOSE SERPL-MCNC: 70 MG/DL
HCT VFR BLD CALC: 40.7 %
HGB BLD-MCNC: 12.9 G/DL
IMM GRANULOCYTES NFR BLD AUTO: 0.2 %
LYMPHOCYTES # BLD AUTO: 1.6 K/UL
LYMPHOCYTES NFR BLD AUTO: 25.8 %
MAN DIFF?: NORMAL
MCHC RBC-ENTMCNC: 30.4 PG
MCHC RBC-ENTMCNC: 31.7 GM/DL
MCV RBC AUTO: 96 FL
MONOCYTES # BLD AUTO: 0.6 K/UL
MONOCYTES NFR BLD AUTO: 9.7 %
NEUTROPHILS # BLD AUTO: 3.88 K/UL
NEUTROPHILS NFR BLD AUTO: 62.7 %
PLATELET # BLD AUTO: 205 K/UL
POTASSIUM SERPL-SCNC: 3.4 MMOL/L
RBC # BLD: 4.24 M/UL
RBC # FLD: 15.6 %
SODIUM SERPL-SCNC: 148 MMOL/L
WBC # FLD AUTO: 6.19 K/UL

## 2018-08-22 ENCOUNTER — APPOINTMENT (OUTPATIENT)
Dept: CARDIOLOGY | Facility: CLINIC | Age: 83
End: 2018-08-22
Payer: COMMERCIAL

## 2018-08-22 VITALS
SYSTOLIC BLOOD PRESSURE: 136 MMHG | BODY MASS INDEX: 35.87 KG/M2 | WEIGHT: 190 LBS | DIASTOLIC BLOOD PRESSURE: 83 MMHG | HEART RATE: 99 BPM | HEIGHT: 61 IN | OXYGEN SATURATION: 96 %

## 2018-08-22 DIAGNOSIS — R09.89 OTHER SPECIFIED SYMPTOMS AND SIGNS INVOLVING THE CIRCULATORY AND RESPIRATORY SYSTEMS: ICD-10-CM

## 2018-08-22 DIAGNOSIS — M79.7 FIBROMYALGIA: ICD-10-CM

## 2018-08-22 DIAGNOSIS — D86.9 SARCOIDOSIS, UNSPECIFIED: ICD-10-CM

## 2018-08-22 DIAGNOSIS — I27.20 PULMONARY HYPERTENSION, UNSPECIFIED: ICD-10-CM

## 2018-08-22 DIAGNOSIS — I65.29 OCCLUSION AND STENOSIS OF UNSPECIFIED CAROTID ARTERY: ICD-10-CM

## 2018-08-22 PROCEDURE — 99214 OFFICE O/P EST MOD 30 MIN: CPT

## 2018-08-22 RX ORDER — POTASSIUM CHLORIDE 1.5 G/1.58G
20 POWDER, FOR SOLUTION ORAL
Refills: 0 | Status: ACTIVE | COMMUNITY

## 2018-09-03 LAB
ANION GAP SERPL CALC-SCNC: 14 MMOL/L
BUN SERPL-MCNC: 25 MG/DL
CALCIUM SERPL-MCNC: 9.9 MG/DL
CHLORIDE SERPL-SCNC: 100 MMOL/L
CO2 SERPL-SCNC: 25 MMOL/L
CREAT SERPL-MCNC: 0.88 MG/DL
GLUCOSE SERPL-MCNC: 178 MG/DL
POTASSIUM SERPL-SCNC: 3.9 MMOL/L
SODIUM SERPL-SCNC: 139 MMOL/L

## 2018-09-06 ENCOUNTER — RESULT CHARGE (OUTPATIENT)
Age: 83
End: 2018-09-06

## 2018-09-07 ENCOUNTER — APPOINTMENT (OUTPATIENT)
Dept: PULMONOLOGY | Facility: CLINIC | Age: 83
End: 2018-09-07
Payer: COMMERCIAL

## 2018-09-07 VITALS
OXYGEN SATURATION: 95 % | WEIGHT: 185 LBS | DIASTOLIC BLOOD PRESSURE: 108 MMHG | SYSTOLIC BLOOD PRESSURE: 156 MMHG | TEMPERATURE: 97.8 F | HEIGHT: 61 IN | BODY MASS INDEX: 34.93 KG/M2 | HEART RATE: 86 BPM

## 2018-09-07 VITALS — SYSTOLIC BLOOD PRESSURE: 138 MMHG | DIASTOLIC BLOOD PRESSURE: 91 MMHG

## 2018-09-07 DIAGNOSIS — M10.9 GOUT, UNSPECIFIED: ICD-10-CM

## 2018-09-07 DIAGNOSIS — I51.9 HEART DISEASE, UNSPECIFIED: ICD-10-CM

## 2018-09-07 DIAGNOSIS — R93.8 ABNORMAL FINDINGS ON DIAGNOSTIC IMAGING OF OTHER SPECIFIED BODY STRUCTURES: ICD-10-CM

## 2018-09-07 DIAGNOSIS — R06.89 OTHER ABNORMALITIES OF BREATHING: ICD-10-CM

## 2018-09-07 PROCEDURE — 99244 OFF/OP CNSLTJ NEW/EST MOD 40: CPT | Mod: 25

## 2018-09-07 PROCEDURE — 88738 HGB QUANT TRANSCUTANEOUS: CPT

## 2018-09-07 PROCEDURE — 94727 GAS DIL/WSHOT DETER LNG VOL: CPT

## 2018-09-07 PROCEDURE — 94060 EVALUATION OF WHEEZING: CPT

## 2018-09-07 PROCEDURE — 94729 DIFFUSING CAPACITY: CPT

## 2018-09-07 RX ORDER — NEBULIZER ACCESSORIES
KIT MISCELLANEOUS
Qty: 1 | Refills: 1 | Status: ACTIVE | COMMUNITY
Start: 2018-09-07 | End: 1900-01-01

## 2018-09-07 RX ORDER — ALBUTEROL SULFATE 2.5 MG/3ML
(2.5 MG/3ML) SOLUTION RESPIRATORY (INHALATION)
Qty: 120 | Refills: 5 | Status: ACTIVE | COMMUNITY
Start: 2018-09-07 | End: 1900-01-01

## 2018-09-07 NOTE — HISTORY OF PRESENT ILLNESS
[FreeTextEntry1] : Genesis is a pleasant 83-year-old female with history of COPD/asthma, gout, osteoarthritis, hypercholesterolemia, hypertension, she came in with complaints of worsening shortness of breath and a whitish productive cough for several years, she does not have chest pain, fever or chills.

## 2018-09-07 NOTE — PROCEDURE
[FreeTextEntry1] : Pulmonary function tests performed in office today: Lung volume is within normal limits; spirometry showed suggestive evidence of mild restrictive defect with some improvement postbronchodilator; diffusion showed moderate impairment.\par \par Chest CT scan performed on October 25, 2017 showed no new or enlarging pulmonary nodule. Stable patchy areas of peribronchial groundglass opacity within both upper lobes. Recommend continued chest CT followup as clinically indicated

## 2018-09-07 NOTE — CONSULT LETTER
[Dear  ___] : Dear  [unfilled], [Courtesy Letter:] : I had the pleasure of seeing your patient, [unfilled], in my office today. [Please see my note below.] : Please see my note below. [Consult Closing:] : Thank you very much for allowing me to participate in the care of this patient.  If you have any questions, please do not hesitate to contact me. [Sincerely,] : Sincerely, [FreeTextEntry3] : Dr. Lola Causey [DrYoni  ___] : Dr. PARIKH

## 2018-09-07 NOTE — REASON FOR VISIT
[Consultation] : a consultation visit [COPD] : COPD [Cough] : cough [Shortness of Breath] : shortness of Breath

## 2018-09-07 NOTE — ASSESSMENT
[FreeTextEntry1] : Impression: Genesis is a patient with worsening cough and shortness of breath likely secondary to COPD/asthma. Secondly, Keara is a patient with history of stable peribronchial groundglass opacities shown on chest CT scan, likely secondary to post inflammatory changes/scarring.\par \par Plan: I advised Genesis to continue to use Spiriva. I am discontinuing Advair at this point, I will start Genesis on Breo 100/25 mcg Ellipta. I am also starting Keara on albuterol via nebulizer 3 times a day. I will order a noncontrast chest CT scan for followup at this point. I advised her to return to the office for followup visit after a chest CT scan has been performed. I also advised her to follow with you from Cardiology perspective.

## 2018-09-07 NOTE — PHYSICAL EXAM
[General Appearance - Well Developed] : well developed [Normal Appearance] : normal appearance [Well Groomed] : well groomed [General Appearance - Well Nourished] : well nourished [No Deformities] : no deformities [General Appearance - In No Acute Distress] : no acute distress [Normal Oropharynx] : normal oropharynx [Heart Rate And Rhythm] : heart rate and rhythm were normal [Heart Sounds] : normal S1 and S2 [Murmurs] : no murmurs present [FreeTextEntry1] : Decreased breath sounds on bases. [Abdomen Soft] : soft [Abdomen Tenderness] : non-tender [Abdomen Mass (___ Cm)] : no abdominal mass palpated [Abnormal Walk] : normal gait [Gait - Sufficient For Exercise Testing] : the gait was sufficient for exercise testing [Nail Clubbing] : no clubbing of the fingernails [Cyanosis, Localized] : no localized cyanosis [Petechial Hemorrhages (___cm)] : no petechial hemorrhages [] : no ischemic changes

## 2018-09-13 ENCOUNTER — MEDICATION RENEWAL (OUTPATIENT)
Age: 83
End: 2018-09-13

## 2018-09-13 RX ORDER — LIDOCAINE 5% 700 MG/1
5 PATCH TOPICAL
Qty: 90 | Refills: 3 | Status: ACTIVE | COMMUNITY
Start: 2017-07-10 | End: 1900-01-01

## 2018-09-14 ENCOUNTER — MEDICATION RENEWAL (OUTPATIENT)
Age: 83
End: 2018-09-14

## 2018-09-14 RX ORDER — LIDOCAINE 5% 700 MG/1
5 PATCH TOPICAL
Qty: 270 | Refills: 2 | Status: ACTIVE | COMMUNITY
Start: 2017-07-07 | End: 1900-01-01

## 2018-10-12 ENCOUNTER — APPOINTMENT (OUTPATIENT)
Dept: PULMONOLOGY | Facility: CLINIC | Age: 83
End: 2018-10-12
Payer: COMMERCIAL

## 2018-10-12 VITALS
SYSTOLIC BLOOD PRESSURE: 100 MMHG | BODY MASS INDEX: 36.12 KG/M2 | WEIGHT: 184 LBS | DIASTOLIC BLOOD PRESSURE: 57 MMHG | OXYGEN SATURATION: 98 % | HEIGHT: 60 IN | HEART RATE: 89 BPM | RESPIRATION RATE: 14 BRPM

## 2018-10-12 PROCEDURE — 94060 EVALUATION OF WHEEZING: CPT

## 2018-10-12 PROCEDURE — 94010 BREATHING CAPACITY TEST: CPT

## 2018-10-12 PROCEDURE — 99213 OFFICE O/P EST LOW 20 MIN: CPT | Mod: 25

## 2018-10-22 ENCOUNTER — APPOINTMENT (OUTPATIENT)
Dept: PULMONOLOGY | Facility: CLINIC | Age: 83
End: 2018-10-22
Payer: COMMERCIAL

## 2018-10-22 VITALS
WEIGHT: 184 LBS | RESPIRATION RATE: 16 BRPM | OXYGEN SATURATION: 97 % | HEIGHT: 60 IN | TEMPERATURE: 97.4 F | DIASTOLIC BLOOD PRESSURE: 71 MMHG | SYSTOLIC BLOOD PRESSURE: 100 MMHG | BODY MASS INDEX: 36.12 KG/M2 | HEART RATE: 112 BPM

## 2018-10-22 DIAGNOSIS — Z01.811 ENCOUNTER FOR PREPROCEDURAL RESPIRATORY EXAMINATION: ICD-10-CM

## 2018-10-22 PROCEDURE — 99214 OFFICE O/P EST MOD 30 MIN: CPT | Mod: 25

## 2018-10-22 PROCEDURE — 36415 COLL VENOUS BLD VENIPUNCTURE: CPT

## 2018-10-22 PROCEDURE — 94729 DIFFUSING CAPACITY: CPT

## 2018-10-22 PROCEDURE — 94060 EVALUATION OF WHEEZING: CPT

## 2018-10-22 PROCEDURE — 94727 GAS DIL/WSHOT DETER LNG VOL: CPT

## 2018-10-23 PROBLEM — Z01.811 ENCOUNTER FOR PREOPERATIVE PULMONARY EXAMINATION: Status: ACTIVE | Noted: 2018-10-23

## 2018-10-23 NOTE — PROCEDURE
[FreeTextEntry1] : PET scan performed at [facility] on October 18, 2018: Mild but focal FDG activity corresponding to an evolving nodule in the left upper lung. This represents a malignancy until proven otherwise. Tissue core is patient is suggested. There is no scintigraphic evidence of left hilar or mediastinal adenopathy.\par \par Pulmonary function tests performed in office today: Lung volume was within normal limits: Spirometry showed moderate obstructive airway disease; effusion and shoulder moderate impairment. SpO2 at rest on room air is 97%

## 2018-10-23 NOTE — DISCUSSION/SUMMARY
[FreeTextEntry1] : Genesis is a patient with an evolving 1 x 0.9 cm left upper lobe lung nodule with FDG uptake on the current PET scan, suspicious for early stage lung malignancy. Secondly, she is a patient with history of asthma.

## 2018-10-23 NOTE — PHYSICAL EXAM
[General Appearance - Well Developed] : well developed [Normal Appearance] : normal appearance [Well Groomed] : well groomed [General Appearance - Well Nourished] : well nourished [No Deformities] : no deformities [General Appearance - In No Acute Distress] : no acute distress [Normal Oropharynx] : normal oropharynx [Heart Rate And Rhythm] : heart rate and rhythm were normal [Heart Sounds] : normal S1 and S2 [Murmurs] : no murmurs present [Abdomen Soft] : soft [Abdomen Tenderness] : non-tender [Abdomen Mass (___ Cm)] : no abdominal mass palpated [Abnormal Walk] : normal gait [Gait - Sufficient For Exercise Testing] : the gait was sufficient for exercise testing [Nail Clubbing] : no clubbing of the fingernails [Cyanosis, Localized] : no localized cyanosis [Petechial Hemorrhages (___cm)] : no petechial hemorrhages [] : no ischemic changes [FreeTextEntry1] : Decreased breath sounds on bases.

## 2018-10-23 NOTE — ASSESSMENT
[FreeTextEntry1] : I discussed with Genesis and her  at length regarding aforementioned PET scan findings in my office today. I recommend left VATS with left upper lobe wedge resection of the left upper lobe lung nodule for definitive tissue diagnosis and for probable curative intent. So I took the liberty of referring Genesis to Dr. Brannon Mcclain for thoracic surgery evaluation.There are no acute or active pulmonary contraindications to the proposed thoracic surgery. I also advised her to continue to take Breo and Spiriva.I drew pre-op labs from patient todays. I also advised her to closely follow with you and Dr. Isaac clinically.

## 2018-10-23 NOTE — CONSULT LETTER
[Dear  ___] : Dear  [unfilled], [Courtesy Letter:] : I had the pleasure of seeing your patient, [unfilled], in my office today. [Please see my note below.] : Please see my note below. [Consult Closing:] : Thank you very much for allowing me to participate in the care of this patient.  If you have any questions, please do not hesitate to contact me. [Sincerely,] : Sincerely, [DrYoni  ___] : Dr. PARIKH [DrYoni ___] : Dr. PARIKH [FreeTextEntry3] : Dr. Lola Causey

## 2018-10-23 NOTE — REASON FOR VISIT
[Follow-Up] : a follow-up visit [Abnormal Chest X-Ray] : abnormal Chest X-Ray [Asthma] : asthma [FreeTextEntry2] : h/o lung nodules

## 2018-10-24 ENCOUNTER — APPOINTMENT (OUTPATIENT)
Dept: THORACIC SURGERY | Facility: CLINIC | Age: 83
End: 2018-10-24
Payer: COMMERCIAL

## 2018-10-24 VITALS
WEIGHT: 180 LBS | OXYGEN SATURATION: 96 % | DIASTOLIC BLOOD PRESSURE: 80 MMHG | RESPIRATION RATE: 16 BRPM | HEIGHT: 62 IN | HEART RATE: 86 BPM | BODY MASS INDEX: 33.13 KG/M2 | SYSTOLIC BLOOD PRESSURE: 137 MMHG

## 2018-10-24 PROCEDURE — 99205 OFFICE O/P NEW HI 60 MIN: CPT

## 2018-10-26 ENCOUNTER — APPOINTMENT (OUTPATIENT)
Dept: PULMONOLOGY | Facility: CLINIC | Age: 83
End: 2018-10-26

## 2018-10-28 LAB
ALBUMIN SERPL ELPH-MCNC: 4 G/DL
ALP BLD-CCNC: 140 U/L
ALT SERPL-CCNC: 27 U/L
ANION GAP SERPL CALC-SCNC: 16 MMOL/L
AST SERPL-CCNC: 25 U/L
BASOPHILS # BLD AUTO: 0.02 K/UL
BASOPHILS NFR BLD AUTO: 0.3 %
BILIRUB SERPL-MCNC: 0.8 MG/DL
BUN SERPL-MCNC: 21 MG/DL
CALCIUM SERPL-MCNC: 9.8 MG/DL
CHLORIDE SERPL-SCNC: 99 MMOL/L
CO2 SERPL-SCNC: 25 MMOL/L
CREAT SERPL-MCNC: 0.61 MG/DL
EOSINOPHIL # BLD AUTO: 0.07 K/UL
EOSINOPHIL NFR BLD AUTO: 1.2 %
ERYTHROCYTE [SEDIMENTATION RATE] IN BLOOD BY WESTERGREN METHOD: 30 MM/HR
GLUCOSE SERPL-MCNC: 104 MG/DL
HCT VFR BLD CALC: 41.6 %
HGB BLD-MCNC: 13.3 G/DL
IMM GRANULOCYTES NFR BLD AUTO: 0.2 %
LYMPHOCYTES # BLD AUTO: 1.23 K/UL
LYMPHOCYTES NFR BLD AUTO: 20.8 %
M TB IFN-G BLD-IMP: NEGATIVE
MAN DIFF?: NORMAL
MCHC RBC-ENTMCNC: 29.2 PG
MCHC RBC-ENTMCNC: 32 GM/DL
MCV RBC AUTO: 91.4 FL
MONOCYTES # BLD AUTO: 0.63 K/UL
MONOCYTES NFR BLD AUTO: 10.6 %
NEUTROPHILS # BLD AUTO: 3.96 K/UL
NEUTROPHILS NFR BLD AUTO: 66.9 %
PLATELET # BLD AUTO: 199 K/UL
POTASSIUM SERPL-SCNC: 4.1 MMOL/L
PROT SERPL-MCNC: 6.8 G/DL
QUANTIFERON TB PLUS MITOGEN MINUS NIL: >10 IU/ML
QUANTIFERON TB PLUS NIL: 0.08 IU/ML
QUANTIFERON TB PLUS TB1 MINUS NIL: 0 IU/ML
QUANTIFERON TB PLUS TB2 MINUS NIL: 0.22 IU/ML
RBC # BLD: 4.55 M/UL
RBC # FLD: 14.1 %
SODIUM SERPL-SCNC: 140 MMOL/L
WBC # FLD AUTO: 5.92 K/UL

## 2018-11-06 ENCOUNTER — APPOINTMENT (OUTPATIENT)
Dept: INTERNAL MEDICINE | Facility: CLINIC | Age: 83
End: 2018-11-06
Payer: COMMERCIAL

## 2018-11-06 VITALS
HEIGHT: 60 IN | OXYGEN SATURATION: 98 % | RESPIRATION RATE: 14 BRPM | SYSTOLIC BLOOD PRESSURE: 120 MMHG | HEART RATE: 81 BPM | DIASTOLIC BLOOD PRESSURE: 90 MMHG | TEMPERATURE: 98.3 F | BODY MASS INDEX: 35.14 KG/M2 | WEIGHT: 179 LBS

## 2018-11-06 VITALS — SYSTOLIC BLOOD PRESSURE: 120 MMHG | DIASTOLIC BLOOD PRESSURE: 84 MMHG

## 2018-11-06 DIAGNOSIS — D64.9 ANEMIA, UNSPECIFIED: ICD-10-CM

## 2018-11-06 PROCEDURE — G0008: CPT

## 2018-11-06 PROCEDURE — 36415 COLL VENOUS BLD VENIPUNCTURE: CPT

## 2018-11-06 PROCEDURE — 90662 IIV NO PRSV INCREASED AG IM: CPT

## 2018-11-06 PROCEDURE — 99214 OFFICE O/P EST MOD 30 MIN: CPT | Mod: 25

## 2018-11-06 NOTE — PHYSICAL EXAM
[No Acute Distress] : no acute distress [Well Nourished] : well nourished [Well Developed] : well developed [Well-Appearing] : well-appearing [Normal Sclera/Conjunctiva] : normal sclera/conjunctiva [PERRL] : pupils equal round and reactive to light [EOMI] : extraocular movements intact [Normal Outer Ear/Nose] : the outer ears and nose were normal in appearance [Normal Oropharynx] : the oropharynx was normal [No JVD] : no jugular venous distention [Supple] : supple [No Lymphadenopathy] : no lymphadenopathy [Thyroid Normal, No Nodules] : the thyroid was normal and there were no nodules present [No Respiratory Distress] : no respiratory distress  [No Accessory Muscle Use] : no accessory muscle use [Decreased Breath Sounds] : breath sounds were decreased diffusely [Normal Rate] : normal rate  [Regular Rhythm] : with a regular rhythm [Normal S1, S2] : normal S1 and S2 [No Murmur] : no murmur heard [No Carotid Bruits] : no carotid bruits [No Abdominal Bruit] : a ~M bruit was not heard ~T in the abdomen [No Varicosities] : no varicosities [Pedal Pulses Present] : the pedal pulses are present [No Extremity Clubbing/Cyanosis] : no extremity clubbing/cyanosis [No Palpable Aorta] : no palpable aorta [Soft] : abdomen soft [Non Tender] : non-tender [Non-distended] : non-distended [No Masses] : no abdominal mass palpated [No HSM] : no HSM [Normal Bowel Sounds] : normal bowel sounds [Normal Posterior Cervical Nodes] : no posterior cervical lymphadenopathy [Normal Anterior Cervical Nodes] : no anterior cervical lymphadenopathy [No CVA Tenderness] : no CVA  tenderness [No Spinal Tenderness] : no spinal tenderness [No Joint Swelling] : no joint swelling [Grossly Normal Strength/Tone] : grossly normal strength/tone [No Rash] : no rash [Normal Gait] : normal gait [Coordination Grossly Intact] : coordination grossly intact [No Focal Deficits] : no focal deficits [Deep Tendon Reflexes (DTR)] : deep tendon reflexes were 2+ and symmetric [Speech Grossly Normal] : speech grossly normal [Memory Grossly Normal] : memory grossly normal [Normal Affect] : the affect was normal [Alert and Oriented x3] : oriented to person, place, and time [Normal Insight/Judgement] : insight and judgment were intact [de-identified] : pitting edema of ankles

## 2018-11-07 LAB
ALBUMIN SERPL ELPH-MCNC: 4.1 G/DL
ALP BLD-CCNC: 122 U/L
ALT SERPL-CCNC: 29 U/L
ANION GAP SERPL CALC-SCNC: 16 MMOL/L
AST SERPL-CCNC: 28 U/L
BASOPHILS # BLD AUTO: 0.03 K/UL
BASOPHILS NFR BLD AUTO: 0.7 %
BILIRUB SERPL-MCNC: 0.7 MG/DL
BUN SERPL-MCNC: 20 MG/DL
CALCIUM SERPL-MCNC: 9.9 MG/DL
CHLORIDE SERPL-SCNC: 98 MMOL/L
CO2 SERPL-SCNC: 26 MMOL/L
CREAT SERPL-MCNC: 0.86 MG/DL
EOSINOPHIL # BLD AUTO: 0.05 K/UL
EOSINOPHIL NFR BLD AUTO: 1.1 %
GLUCOSE SERPL-MCNC: 86 MG/DL
HCT VFR BLD CALC: 40.3 %
HGB BLD-MCNC: 13 G/DL
IMM GRANULOCYTES NFR BLD AUTO: 0 %
LYMPHOCYTES # BLD AUTO: 1.44 K/UL
LYMPHOCYTES NFR BLD AUTO: 32.4 %
MAN DIFF?: NORMAL
MCHC RBC-ENTMCNC: 28.6 PG
MCHC RBC-ENTMCNC: 32.3 GM/DL
MCV RBC AUTO: 88.6 FL
MONOCYTES # BLD AUTO: 0.7 K/UL
MONOCYTES NFR BLD AUTO: 15.7 %
NEUTROPHILS # BLD AUTO: 2.23 K/UL
NEUTROPHILS NFR BLD AUTO: 50.1 %
PLATELET # BLD AUTO: 185 K/UL
POTASSIUM SERPL-SCNC: 4 MMOL/L
PROT SERPL-MCNC: 7.3 G/DL
RBC # BLD: 4.55 M/UL
RBC # FLD: 14.9 %
SODIUM SERPL-SCNC: 140 MMOL/L
WBC # FLD AUTO: 4.45 K/UL

## 2018-11-09 ENCOUNTER — MEDICATION RENEWAL (OUTPATIENT)
Age: 83
End: 2018-11-09

## 2018-11-09 RX ORDER — ALLOPURINOL 100 MG/1
100 TABLET ORAL
Qty: 90 | Refills: 1 | Status: ACTIVE | COMMUNITY
Start: 2017-01-27 | End: 1900-01-01

## 2018-11-12 ENCOUNTER — APPOINTMENT (OUTPATIENT)
Dept: CARDIOLOGY | Facility: CLINIC | Age: 83
End: 2018-11-12
Payer: COMMERCIAL

## 2018-11-12 VITALS
SYSTOLIC BLOOD PRESSURE: 165 MMHG | DIASTOLIC BLOOD PRESSURE: 92 MMHG | HEIGHT: 60 IN | WEIGHT: 179 LBS | HEART RATE: 79 BPM | BODY MASS INDEX: 35.14 KG/M2 | OXYGEN SATURATION: 94 %

## 2018-11-12 DIAGNOSIS — R91.1 SOLITARY PULMONARY NODULE: ICD-10-CM

## 2018-11-12 DIAGNOSIS — E78.5 HYPERLIPIDEMIA, UNSPECIFIED: ICD-10-CM

## 2018-11-12 DIAGNOSIS — I10 ESSENTIAL (PRIMARY) HYPERTENSION: ICD-10-CM

## 2018-11-12 DIAGNOSIS — I44.7 LEFT BUNDLE-BRANCH BLOCK, UNSPECIFIED: ICD-10-CM

## 2018-11-12 PROCEDURE — 99214 OFFICE O/P EST MOD 30 MIN: CPT

## 2018-11-12 NOTE — REVIEW OF SYSTEMS
[Recent Weight Gain (___ Lbs)] : recent [unfilled] ~Ulb weight gain [Feeling Fatigued] : feeling fatigued [Lower Ext Edema] : lower extremity edema [Wheezing] : wheezing [Incontinence] : incontinence [Joint Pain] : joint pain [Hand Pain] : hand pain [Knee Pain] : knee pain [Lower Back Pain] : lower back pain [Easy Bruising] : a tendency for easy bruising [Fever] : no fever [Headache] : no headache [Chills] : no chills [Blurry Vision] : no blurred vision [Seeing Double (Diplopia)] : no diplopia [Earache] : no earache [Sore Throat] : no sore throat [Sinus Pressure] : no sinus pressure [Dyspnea on exertion] : not dyspnea during exertion [Chest Pain] : no chest pain [Palpitations] : no palpitations [Cough] : no cough [Skin: A Rash] : no rash: [Dizziness] : no dizziness [Depression] : no depression [Anxiety] : no anxiety [Excessive Thirst] : no polydipsia [Easy Bleeding] : no tendency for easy bleeding [FreeTextEntry2] : IBS

## 2018-11-12 NOTE — PHYSICAL EXAM
[General Appearance - Well Developed] : well developed [Normal Appearance] : normal appearance [Well Groomed] : well groomed [General Appearance - Well Nourished] : well nourished [No Deformities] : no deformities [General Appearance - In No Acute Distress] : no acute distress [Normal Conjunctiva] : the conjunctiva exhibited no abnormalities [Normal Oral Mucosa] : normal oral mucosa [Normal Jugular Venous A Waves Present] : normal jugular venous A waves present [Normal Jugular Venous V Waves Present] : normal jugular venous V waves present [No Jugular Venous Hudson A Waves] : no jugular venous hudson A waves [Respiration, Rhythm And Depth] : normal respiratory rhythm and effort [Exaggerated Use Of Accessory Muscles For Inspiration] : no accessory muscle use [Auscultation Breath Sounds / Voice Sounds] : lungs were clear to auscultation bilaterally [Bowel Sounds] : normal bowel sounds [Abdomen Soft] : soft [Abdomen Tenderness] : non-tender [Nail Clubbing] : no clubbing of the fingernails [Cyanosis, Localized] : no localized cyanosis [Skin Color & Pigmentation] : normal skin color and pigmentation [Skin Turgor] : normal skin turgor [] : no rash [Oriented To Time, Place, And Person] : oriented to person, place, and time [Impaired Insight] : insight and judgment were intact [No Anxiety] : not feeling anxious [Not Palpable] : not palpable [Normal Rate] : normal [Normal S1] : normal S1 [Normal S2] : normal S2 [Distant] : the heart sounds were distant [No Murmur] : no murmurs heard [1+] : right 1+ [2+] : left 2+ [No Abnormalities] : the abdominal aorta was not enlarged and no bruit was heard [___ +] : bilateral [unfilled]U+ pitting edema to the ankles [Rt] : varicose veins of the right leg noted [Lt] : varicose veins of the left leg noted [FreeTextEntry1] : Walks with a walker [S3] : no S3 [S4] : no S4 [Right Carotid Bruit] : no bruit heard over the right carotid [Left Carotid Bruit] : no bruit heard over the left carotid [Right Femoral Bruit] : no bruit heard over the right femoral artery [Left Femoral Bruit] : no bruit heard over the left femoral artery

## 2018-11-12 NOTE — HISTORY OF PRESENT ILLNESS
[FreeTextEntry1] : I saw Roslyn Mendelson the office today for a followup visit, i She is an 83-year-old white female with chronic obesity. She has asthma, and diastolic dysfunction. In 2010 she had a positive chemical nuclear stress test and underwent cardiac catheterization which showed normal arteries with a left ventricular end-diastolic pressure of 28 mmHg. She's been treated for hypertension and hyperlipidemia. She is physically limited by arthritis and shortness of breath. She needs bilateral knee replacement .\par \par .Blood work performed 11/17 demonstrated normal chemistries and CBC. A1c was 5.5. Cholesterol 158, triglycerides 95, HDL 70, LDL 69. BMP performed 8/18 demonstrates a sodium of 148, potassium 3.2, BUN 27, creatinine 0.93. CBC was normal. She is now back on potassium supplementation.\par \par Echocardiogram performed 5/18 showed an ejection fraction of 45% with hypocontractility of the septum consistent with a bundle branch block. There is mild MR and mild tear with a PA pressure of 47. This represented no change.. A carotid Doppler performed 10/13 showed minimal plaque. Holter monitor performed 414 showed normal heart rates with 254 asymptomatic APCs and one 4 beat episode of SVT.\par \par The patient went to the emergency room early this month with symptoms of fatigue, lightheadedness, and nausea. She was noted to be hyperkalemic with a potassium of 7.0 and a creatinine of 1.5. She was in complete heart block and transferred to [place]. With correction of potassium the bundle branch block resolved. She had EPS that  was normal and was elected that she did not need a pacemaker. Her Avapro and potassium were discontinued. On discharge potassium was 4.2 and creatinine 0.7. Offer for her blood pressure medication her blood pressure has been stable.\par \par The patient is complaining of increasing dyspnea on exertion. This most likely is on a pulmonary basis. She does have significant COPD. She has no chest pain, or palpitations. She was sent to the lung doctor who found a pulmonary nodule that was suspicious for malignancy. First recommendation was for surgery. She had 2 separate opinions, one at [facility] and one at [place]. Both felt that surgery would be too risky and that this should be watched. If it progresses and behaves like a malignant lesion she would then get radiation therapy. The patient was good with this decision.

## 2018-11-12 NOTE — DISCUSSION/SUMMARY
[FreeTextEntry1] : The patient's cardiac status appears stable. If she does require lung surgery unfortunately she probably would need a cardiac CT angiogram since she had false positive stress test in 2010. In the absence of surgery I would not do any further cardiac testing at this time unless she has further symptoms. She'll be having a carotid Doppler. Assuming this is stable I will see her in 3 months

## 2018-11-19 ENCOUNTER — APPOINTMENT (OUTPATIENT)
Dept: THORACIC SURGERY | Facility: HOSPITAL | Age: 83
End: 2018-11-19

## 2018-11-21 ENCOUNTER — MEDICATION RENEWAL (OUTPATIENT)
Age: 83
End: 2018-11-21

## 2018-11-21 RX ORDER — SIMVASTATIN 20 MG/1
20 TABLET, FILM COATED ORAL DAILY
Qty: 90 | Refills: 3 | Status: ACTIVE | COMMUNITY
Start: 2017-01-27 | End: 1900-01-01

## 2018-11-30 ENCOUNTER — APPOINTMENT (OUTPATIENT)
Dept: CARDIOLOGY | Facility: CLINIC | Age: 83
End: 2018-11-30
Payer: COMMERCIAL

## 2018-11-30 PROCEDURE — 93880 EXTRACRANIAL BILAT STUDY: CPT

## 2018-11-30 RX ORDER — CELECOXIB 100 MG/1
100 CAPSULE ORAL
Qty: 90 | Refills: 1 | Status: ACTIVE | COMMUNITY
Start: 2018-11-30 | End: 1900-01-01

## 2018-12-04 ENCOUNTER — MEDICATION RENEWAL (OUTPATIENT)
Age: 83
End: 2018-12-04

## 2018-12-04 RX ORDER — POTASSIUM CHLORIDE 1500 MG/1
20 TABLET, EXTENDED RELEASE ORAL DAILY
Qty: 90 | Refills: 1 | Status: ACTIVE | COMMUNITY
Start: 2018-02-21 | End: 1900-01-01

## 2018-12-06 RX ORDER — HYOSCYAMINE SULFATE 0.12 MG/1
0.12 TABLET ORAL 3 TIMES DAILY
Qty: 270 | Refills: 0 | Status: ACTIVE | COMMUNITY

## 2019-01-28 ENCOUNTER — RX RENEWAL (OUTPATIENT)
Age: 84
End: 2019-01-28

## 2019-02-10 RX ORDER — FLUTICASONE FUROATE AND VILANTEROL TRIFENATATE 100; 25 UG/1; UG/1
100-25 POWDER RESPIRATORY (INHALATION) DAILY
Qty: 2 | Refills: 2 | Status: ACTIVE | COMMUNITY
Start: 2018-09-07 | End: 1900-01-01

## 2019-02-20 ENCOUNTER — APPOINTMENT (OUTPATIENT)
Dept: MRI IMAGING | Facility: CLINIC | Age: 84
End: 2019-02-20

## 2019-03-05 ENCOUNTER — APPOINTMENT (OUTPATIENT)
Dept: PULMONOLOGY | Facility: CLINIC | Age: 84
End: 2019-03-05
Payer: MEDICARE

## 2019-03-05 VITALS
DIASTOLIC BLOOD PRESSURE: 69 MMHG | OXYGEN SATURATION: 93 % | RESPIRATION RATE: 16 BRPM | TEMPERATURE: 98.8 F | SYSTOLIC BLOOD PRESSURE: 132 MMHG | HEART RATE: 63 BPM

## 2019-03-05 DIAGNOSIS — J44.9 CHRONIC OBSTRUCTIVE PULMONARY DISEASE, UNSPECIFIED: ICD-10-CM

## 2019-03-05 PROCEDURE — 71046 X-RAY EXAM CHEST 2 VIEWS: CPT

## 2019-03-05 PROCEDURE — 94060 EVALUATION OF WHEEZING: CPT

## 2019-03-05 PROCEDURE — 99214 OFFICE O/P EST MOD 30 MIN: CPT | Mod: 25

## 2019-03-05 RX ORDER — NYSTATIN 100000 [USP'U]/ML
100000 SUSPENSION ORAL 3 TIMES DAILY
Qty: 300 | Refills: 0 | Status: ACTIVE | COMMUNITY
Start: 2019-03-05 | End: 1900-01-01

## 2019-03-05 RX ORDER — PREDNISONE 10 MG/1
10 TABLET ORAL
Qty: 12 | Refills: 0 | Status: ACTIVE | COMMUNITY
Start: 2019-03-05 | End: 1900-01-01

## 2019-03-05 RX ORDER — AZITHROMYCIN 250 MG/1
250 TABLET, FILM COATED ORAL
Qty: 6 | Refills: 1 | Status: ACTIVE | COMMUNITY
Start: 2019-03-05 | End: 1900-01-01

## 2019-03-05 NOTE — DISCUSSION/SUMMARY
[FreeTextEntry1] : Patient with worsening cough or shortness of breath likely secondary to progressive metastatic lung cancer and history of asthma

## 2019-03-05 NOTE — PROCEDURE
[FreeTextEntry1] : Chest x-ray PA and lateral views performed in my office today shows a large left upper lung field density, a small left pleural effusion.\par \par Spirometry performed in the office to be sure with suggested evidence of moderate restrictive defect with some improvement post bronchodilator

## 2019-03-05 NOTE — REASON FOR VISIT
[Acute] : an acute visit [COPD] : COPD [Cough] : cough [Shortness of Breath] : shortness of Breath [FreeTextEntry2] : clear cough, then turned grayish for 10 days. No fever, chills

## 2019-03-05 NOTE — ASSESSMENT
[FreeTextEntry1] : I advised her to continue to use Breo and Spiriva. I am starting her on a course of Z-Wesley along with a course of prednisone taper: 30 mg p.o. q.d. for 2 days, then 20 mg p.o. q.d. for 2 days, then 10 mg p.o. q.d. for 2 days. Genesis elected to get comfort care only for her history of lung cancer, so I advised her to followup with Hem/Onc for further cancer management. Very poor prognosis.

## 2019-03-15 ENCOUNTER — APPOINTMENT (OUTPATIENT)
Dept: PULMONOLOGY | Facility: CLINIC | Age: 84
End: 2019-03-15
Payer: MEDICARE

## 2019-03-15 VITALS
SYSTOLIC BLOOD PRESSURE: 130 MMHG | WEIGHT: 180 LBS | OXYGEN SATURATION: 96 % | BODY MASS INDEX: 35.15 KG/M2 | DIASTOLIC BLOOD PRESSURE: 73 MMHG | HEART RATE: 79 BPM

## 2019-03-15 DIAGNOSIS — J45.909 UNSPECIFIED ASTHMA, UNCOMPLICATED: ICD-10-CM

## 2019-03-15 DIAGNOSIS — R05 COUGH: ICD-10-CM

## 2019-03-15 DIAGNOSIS — C34.90 MALIGNANT NEOPLASM OF UNSPECIFIED PART OF UNSPECIFIED BRONCHUS OR LUNG: ICD-10-CM

## 2019-03-15 DIAGNOSIS — C78.00 SECONDARY MALIGNANT NEOPLASM OF UNSPECIFIED LUNG: ICD-10-CM

## 2019-03-15 PROCEDURE — 94010 BREATHING CAPACITY TEST: CPT

## 2019-03-15 PROCEDURE — 99213 OFFICE O/P EST LOW 20 MIN: CPT | Mod: 25

## 2019-03-16 PROBLEM — R05 COUGH: Status: ACTIVE | Noted: 2019-03-05

## 2019-03-16 PROBLEM — C78.00 LUNG METASTASES: Status: ACTIVE | Noted: 2019-03-16

## 2019-03-16 PROBLEM — C34.90 LUNG CANCER: Status: ACTIVE | Noted: 2019-03-05

## 2019-03-16 NOTE — REASON FOR VISIT
[Follow-Up] : a follow-up visit [Cough] : cough [Shortness of Breath] : shortness of Breath [FreeTextEntry2] : much better

## 2019-04-09 ENCOUNTER — EMERGENCY (EMERGENCY)
Facility: HOSPITAL | Age: 84
LOS: 1 days | Discharge: HOME | End: 2019-04-09
Attending: EMERGENCY MEDICINE | Admitting: EMERGENCY MEDICINE
Payer: MEDICARE

## 2019-04-09 VITALS
OXYGEN SATURATION: 94 % | HEART RATE: 89 BPM | TEMPERATURE: 98 F | SYSTOLIC BLOOD PRESSURE: 146 MMHG | DIASTOLIC BLOOD PRESSURE: 87 MMHG | RESPIRATION RATE: 18 BRPM

## 2019-04-09 VITALS
DIASTOLIC BLOOD PRESSURE: 89 MMHG | HEART RATE: 58 BPM | WEIGHT: 179.9 LBS | SYSTOLIC BLOOD PRESSURE: 138 MMHG | RESPIRATION RATE: 18 BRPM | TEMPERATURE: 98 F | OXYGEN SATURATION: 93 %

## 2019-04-09 DIAGNOSIS — Z98.89 OTHER SPECIFIED POSTPROCEDURAL STATES: Chronic | ICD-10-CM

## 2019-04-09 DIAGNOSIS — Z90.710 ACQUIRED ABSENCE OF BOTH CERVIX AND UTERUS: Chronic | ICD-10-CM

## 2019-04-09 DIAGNOSIS — Z09 ENCOUNTER FOR FOLLOW-UP EXAMINATION AFTER COMPLETED TREATMENT FOR CONDITIONS OTHER THAN MALIGNANT NEOPLASM: Chronic | ICD-10-CM

## 2019-04-09 LAB
ALBUMIN SERPL ELPH-MCNC: 2.2 G/DL — LOW (ref 3.3–5)
ALP SERPL-CCNC: 121 U/L — HIGH (ref 40–120)
ALT FLD-CCNC: 12 U/L — SIGNIFICANT CHANGE UP (ref 12–78)
ANION GAP SERPL CALC-SCNC: 11 MMOL/L — SIGNIFICANT CHANGE UP (ref 5–17)
APTT BLD: 26.9 SEC — LOW (ref 27.5–36.3)
AST SERPL-CCNC: 16 U/L — SIGNIFICANT CHANGE UP (ref 15–37)
BILIRUB SERPL-MCNC: 0.6 MG/DL — SIGNIFICANT CHANGE UP (ref 0.2–1.2)
BUN SERPL-MCNC: 18 MG/DL — SIGNIFICANT CHANGE UP (ref 7–23)
CALCIUM SERPL-MCNC: 9 MG/DL — SIGNIFICANT CHANGE UP (ref 8.5–10.1)
CHLORIDE SERPL-SCNC: 97 MMOL/L — SIGNIFICANT CHANGE UP (ref 96–108)
CO2 SERPL-SCNC: 29 MMOL/L — SIGNIFICANT CHANGE UP (ref 22–31)
CREAT SERPL-MCNC: 0.56 MG/DL — SIGNIFICANT CHANGE UP (ref 0.5–1.3)
GLUCOSE SERPL-MCNC: 141 MG/DL — HIGH (ref 70–99)
HCT VFR BLD CALC: 37.8 % — SIGNIFICANT CHANGE UP (ref 34.5–45)
HGB BLD-MCNC: 12.4 G/DL — SIGNIFICANT CHANGE UP (ref 11.5–15.5)
INR BLD: 1.15 RATIO — SIGNIFICANT CHANGE UP (ref 0.88–1.16)
MCHC RBC-ENTMCNC: 27.9 PG — SIGNIFICANT CHANGE UP (ref 27–34)
MCHC RBC-ENTMCNC: 32.8 GM/DL — SIGNIFICANT CHANGE UP (ref 32–36)
MCV RBC AUTO: 84.9 FL — SIGNIFICANT CHANGE UP (ref 80–100)
NRBC # BLD: 0 /100 WBCS — SIGNIFICANT CHANGE UP (ref 0–0)
PLATELET # BLD AUTO: 271 K/UL — SIGNIFICANT CHANGE UP (ref 150–400)
POTASSIUM SERPL-MCNC: 3.3 MMOL/L — LOW (ref 3.5–5.3)
POTASSIUM SERPL-SCNC: 3.3 MMOL/L — LOW (ref 3.5–5.3)
PROT SERPL-MCNC: 6.4 G/DL — SIGNIFICANT CHANGE UP (ref 6–8.3)
PROTHROM AB SERPL-ACNC: 13.1 SEC — HIGH (ref 10–12.9)
RBC # BLD: 4.45 M/UL — SIGNIFICANT CHANGE UP (ref 3.8–5.2)
RBC # FLD: 14.2 % — SIGNIFICANT CHANGE UP (ref 10.3–14.5)
SODIUM SERPL-SCNC: 137 MMOL/L — SIGNIFICANT CHANGE UP (ref 135–145)
WBC # BLD: 10.27 K/UL — SIGNIFICANT CHANGE UP (ref 3.8–10.5)
WBC # FLD AUTO: 10.27 K/UL — SIGNIFICANT CHANGE UP (ref 3.8–10.5)

## 2019-04-09 PROCEDURE — 93971 EXTREMITY STUDY: CPT

## 2019-04-09 PROCEDURE — 36415 COLL VENOUS BLD VENIPUNCTURE: CPT

## 2019-04-09 PROCEDURE — 99285 EMERGENCY DEPT VISIT HI MDM: CPT

## 2019-04-09 PROCEDURE — 73030 X-RAY EXAM OF SHOULDER: CPT

## 2019-04-09 PROCEDURE — 85730 THROMBOPLASTIN TIME PARTIAL: CPT

## 2019-04-09 PROCEDURE — 85610 PROTHROMBIN TIME: CPT

## 2019-04-09 PROCEDURE — 96374 THER/PROPH/DIAG INJ IV PUSH: CPT

## 2019-04-09 PROCEDURE — 80053 COMPREHEN METABOLIC PANEL: CPT

## 2019-04-09 PROCEDURE — 96375 TX/PRO/DX INJ NEW DRUG ADDON: CPT

## 2019-04-09 PROCEDURE — 71045 X-RAY EXAM CHEST 1 VIEW: CPT | Mod: 26

## 2019-04-09 PROCEDURE — 71045 X-RAY EXAM CHEST 1 VIEW: CPT

## 2019-04-09 PROCEDURE — 93971 EXTREMITY STUDY: CPT | Mod: 26,RT

## 2019-04-09 PROCEDURE — 73030 X-RAY EXAM OF SHOULDER: CPT | Mod: 26,RT

## 2019-04-09 PROCEDURE — 99284 EMERGENCY DEPT VISIT MOD MDM: CPT | Mod: 25

## 2019-04-09 PROCEDURE — 85027 COMPLETE CBC AUTOMATED: CPT

## 2019-04-09 RX ORDER — ONDANSETRON 8 MG/1
4 TABLET, FILM COATED ORAL ONCE
Qty: 0 | Refills: 0 | Status: COMPLETED | OUTPATIENT
Start: 2019-04-09 | End: 2019-04-09

## 2019-04-09 RX ORDER — HYDROMORPHONE HYDROCHLORIDE 2 MG/ML
1 INJECTION INTRAMUSCULAR; INTRAVENOUS; SUBCUTANEOUS
Qty: 16 | Refills: 0 | OUTPATIENT
Start: 2019-04-09 | End: 2019-04-12

## 2019-04-09 RX ORDER — HYDROMORPHONE HYDROCHLORIDE 2 MG/ML
0.5 INJECTION INTRAMUSCULAR; INTRAVENOUS; SUBCUTANEOUS ONCE
Qty: 0 | Refills: 0 | Status: DISCONTINUED | OUTPATIENT
Start: 2019-04-09 | End: 2019-04-09

## 2019-04-09 RX ORDER — ONDANSETRON 8 MG/1
4 TABLET, FILM COATED ORAL ONCE
Qty: 0 | Refills: 0 | Status: DISCONTINUED | OUTPATIENT
Start: 2019-04-09 | End: 2019-04-09

## 2019-04-09 RX ORDER — ONDANSETRON 8 MG/1
1 TABLET, FILM COATED ORAL
Qty: 16 | Refills: 0 | OUTPATIENT
Start: 2019-04-09 | End: 2019-04-12

## 2019-04-09 RX ORDER — HYDROMORPHONE HYDROCHLORIDE 2 MG/ML
2 INJECTION INTRAMUSCULAR; INTRAVENOUS; SUBCUTANEOUS ONCE
Qty: 0 | Refills: 0 | Status: DISCONTINUED | OUTPATIENT
Start: 2019-04-09 | End: 2019-04-09

## 2019-04-09 RX ADMIN — ONDANSETRON 4 MILLIGRAM(S): 8 TABLET, FILM COATED ORAL at 22:59

## 2019-04-09 RX ADMIN — HYDROMORPHONE HYDROCHLORIDE 0.5 MILLIGRAM(S): 2 INJECTION INTRAMUSCULAR; INTRAVENOUS; SUBCUTANEOUS at 20:46

## 2019-04-09 RX ADMIN — HYDROMORPHONE HYDROCHLORIDE 2 MILLIGRAM(S): 2 INJECTION INTRAMUSCULAR; INTRAVENOUS; SUBCUTANEOUS at 22:59

## 2019-04-09 RX ADMIN — ONDANSETRON 4 MILLIGRAM(S): 8 TABLET, FILM COATED ORAL at 20:46

## 2019-04-09 RX ADMIN — HYDROMORPHONE HYDROCHLORIDE 0.5 MILLIGRAM(S): 2 INJECTION INTRAMUSCULAR; INTRAVENOUS; SUBCUTANEOUS at 22:56

## 2019-04-09 NOTE — ED PROVIDER NOTE - OBJECTIVE STATEMENT
82 yo white female with H/O COPD, Arthralgia of both knees    Essential hypertension    Gout    Hiatal hernia with GERD    Irritable bowel syndrome with both constipation and diarrhea    LBBB (left bundle branch block)    Lumbar stenosis    Lymphadenopathy    Obesity    Pulmonary emphysema, unspecified emphysema type and stage 4 non small cell lung ca, treated at [facility] most recently with RTx to right shoulder for bony mets now here today with increasing right shoulder pain, sharp, increased with movement and better at rest. Patient states that pain increased with tripping but not falling. In addition patient states that she has noted worsening swelling to right arm as well. No fever, chills and no change in her baseline degree of SOB.

## 2019-04-09 NOTE — ED PROVIDER NOTE - MUSCULOSKELETAL, MLM
Marked tender right shoulder with swelling and bogginess to bursae. Right arm with slight swelling as compared to left.

## 2019-04-09 NOTE — ED ADULT NURSE NOTE - OBJECTIVE STATEMENT
patient with swelling of right shoulder and down arm, receiving radiation for cancer and worsening over time, today with severe pain. Otherwise no complaints.

## 2019-04-09 NOTE — ED ADULT NURSE NOTE - NSIMPLEMENTINTERV_GEN_ALL_ED
Implemented All Fall with Harm Risk Interventions:  [place] to call system. Call bell, personal items and telephone within reach. Instruct patient to call for assistance. Room bathroom lighting operational. Non-slip footwear when patient is off stretcher. Physically safe environment: no spills, clutter or unnecessary equipment. Stretcher in lowest position, wheels locked, appropriate side rails in place. Provide visual cue, wrist band, yellow gown, etc. Monitor gait and stability. Monitor for mental status changes and reorient to person, place, and time. Review medications for side effects contributing to fall risk. Reinforce activity limits and safety measures with patient and family. Provide visual clues: red socks.

## 2019-04-09 NOTE — ED PROVIDER NOTE - NSFOLLOWUPINSTRUCTIONS_ED_ALL_ED_FT
Rest  Sling  DILAUDID every 6-hours if needed for pain  ZOFRAN every 6-hours if needed for nausea  Follow-up at [facility] in the morning  Return here if needed

## 2019-04-09 NOTE — ED PROVIDER NOTE - CLINICAL SUMMARY MEDICAL DECISION MAKING FREE TEXT BOX
Worsening pain and swelling to right shoulder and arm requiring evaluation, labs, x-rays and ultrasound followed by meds for pain.

## 2019-04-09 NOTE — ED PROVIDER NOTE - CONSTITUTIONAL, MLM
normal... Uncomfortable appearing elderly white female, well nourished, awake, alert, oriented to person, place, time/situation and in mild apparent distress.

## 2019-04-09 NOTE — ED PROVIDER NOTE - PROGRESS NOTE DETAILS
X-rays of shoulder reviewed with radiologist at [place]. No acute Fx Feels better at this time and wants to go home and will follow up at [facility] in the morning/

## 2019-04-10 ENCOUNTER — RX RENEWAL (OUTPATIENT)
Age: 84
End: 2019-04-10

## 2019-04-10 RX ORDER — ALBUTEROL SULFATE 2.5 MG/3ML
(2.5 MG/3ML) SOLUTION RESPIRATORY (INHALATION)
Qty: 5 | Refills: 5 | Status: ACTIVE | COMMUNITY
Start: 2018-10-12 | End: 1900-01-01

## 2019-04-10 NOTE — ED ADULT NURSE REASSESSMENT NOTE - NS ED NURSE REASSESS COMMENT FT1
pain much improved after medications, ready to go home. Will f/u with her scheduled appointment at [facility]. Alert, oriented x 3. D/C to home with  at this time.

## 2019-04-12 ENCOUNTER — APPOINTMENT (OUTPATIENT)
Dept: ORTHOPEDIC SURGERY | Facility: CLINIC | Age: 84
End: 2019-04-12

## 2020-03-19 NOTE — ASSESSMENT
----- Message from Rachel Byrd sent at 3/19/2020  2:14 PM CDT -----  Contact: andrea Hammer   Mom would like a call back about a headache & fever.    [FreeTextEntry1] : Breo\par Spiriva\par Very poor prognosis

## 2020-09-18 NOTE — ED ADULT TRIAGE NOTE - PAIN RATING/NUMBER SCALE (0-10): ACTIVITY
Matthew Hernandez  GASTROENTEROLOGY  [address] [place] [address]  Phone: [phone]  Fax: [phone]  Established Patient  Follow Up Time: 2 weeks    Parker Hernandez  INTERNAL MEDICINE  [address] [place] [address], [facility] [address]  [place] [address]  Phone: [phone]  Fax: [phone]  Established Patient  Follow Up Time: 1 week   0

## 2021-10-18 NOTE — PROGRESS NOTE ADULT - PROBLEM SELECTOR PROBLEM 2
LBBB (left bundle branch block)
Yes...
LBBB (left bundle branch block)

## 2024-01-08 NOTE — HEALTH RISK ASSESSMENT
[No falls in past year] : Patient reported no falls in the past year [0] : 2) Feeling down, depressed, or hopeless: Not at all (0) [] : No yes

## 2024-06-13 NOTE — ED ADULT NURSE NOTE - PAIN RATING/NUMBER SCALE (0-10): ACTIVITY
Thanks for coming today.  [facility]  [address] [place] [address]    To schedule future appointments in Ortho Clinic, you may call [phone].    To schedule ordered imaging or an injection ordered by your provider:  Call Central Imaging Injection scheduling line: [phone]    MyChart available online at:  [email or web address]    Please call if any further questions or concerns ([phone]).  Clinic hours 8 am to 5 pm.    Return to clinic (call) if symptoms worsen or fail to improve.   0

## 2024-08-21 NOTE — PATIENT PROFILE ADULT. - FUNCTIONAL SCREEN CURRENT LEVEL: TOILETING, MLM
[Well Developed] : well developed [Well Nourished] : well nourished [No Acute Distress] : no acute distress [Normal Conjunctiva] : normal conjunctiva [Normal Venous Pressure] : normal venous pressure [5th Left ICS - MCL] : palpated at the 5th LICS in the midclavicular line [Normal] : normal [No Precordial Heave] : no precordial heave was noted [Normal Rate] : normal [Rhythm Regular] : regular [Normal S1] : normal S1 [Normal S2] : normal S2 [No Murmur] : no murmurs heard [2+] : left 2+ [Clear Lung Fields] : clear lung fields [Good Air Entry] : good air entry [No Respiratory Distress] : no respiratory distress  [Soft] : abdomen soft [Non Tender] : non-tender [Normal Bowel Sounds] : normal bowel sounds [Normal Gait] : normal gait [No Varicosities] : no varicosities [Moves all extremities] : moves all extremities [No Focal Deficits] : no focal deficits [Alert and Oriented] : alert and oriented [___+] : [unfilled]U+ pretibial pitting edema on the left [de-identified] : No evidence of inguinal hernia or other mass.  (3) assistive equipment and person